# Patient Record
Sex: MALE | Race: WHITE | NOT HISPANIC OR LATINO | URBAN - METROPOLITAN AREA
[De-identification: names, ages, dates, MRNs, and addresses within clinical notes are randomized per-mention and may not be internally consistent; named-entity substitution may affect disease eponyms.]

---

## 2022-01-01 ENCOUNTER — INPATIENT (INPATIENT)
Facility: HOSPITAL | Age: 67
LOS: 5 days | End: 2022-01-09
Attending: INTERNAL MEDICINE | Admitting: INTERNAL MEDICINE
Payer: COMMERCIAL

## 2022-01-01 VITALS
SYSTOLIC BLOOD PRESSURE: 110 MMHG | DIASTOLIC BLOOD PRESSURE: 67 MMHG | RESPIRATION RATE: 18 BRPM | HEART RATE: 100 BPM | OXYGEN SATURATION: 98 % | TEMPERATURE: 98 F

## 2022-01-01 VITALS
RESPIRATION RATE: 18 BRPM | HEART RATE: 108 BPM | OXYGEN SATURATION: 97 % | TEMPERATURE: 98 F | SYSTOLIC BLOOD PRESSURE: 90 MMHG | DIASTOLIC BLOOD PRESSURE: 54 MMHG

## 2022-01-01 DIAGNOSIS — K74.60 UNSPECIFIED CIRRHOSIS OF LIVER: ICD-10-CM

## 2022-01-01 DIAGNOSIS — E87.0 HYPEROSMOLALITY AND HYPERNATREMIA: ICD-10-CM

## 2022-01-01 DIAGNOSIS — N17.9 ACUTE KIDNEY FAILURE, UNSPECIFIED: ICD-10-CM

## 2022-01-01 DIAGNOSIS — Z29.9 ENCOUNTER FOR PROPHYLACTIC MEASURES, UNSPECIFIED: ICD-10-CM

## 2022-01-01 DIAGNOSIS — E11.9 TYPE 2 DIABETES MELLITUS WITHOUT COMPLICATIONS: ICD-10-CM

## 2022-01-01 DIAGNOSIS — A41.9 SEPSIS, UNSPECIFIED ORGANISM: ICD-10-CM

## 2022-01-01 DIAGNOSIS — D64.9 ANEMIA, UNSPECIFIED: ICD-10-CM

## 2022-01-01 DIAGNOSIS — D69.6 THROMBOCYTOPENIA, UNSPECIFIED: ICD-10-CM

## 2022-01-01 DIAGNOSIS — U07.1 COVID-19: ICD-10-CM

## 2022-01-01 DIAGNOSIS — R94.31 ABNORMAL ELECTROCARDIOGRAM [ECG] [EKG]: ICD-10-CM

## 2022-01-01 DIAGNOSIS — M86.68 OTHER CHRONIC OSTEOMYELITIS, OTHER SITE: ICD-10-CM

## 2022-01-01 DIAGNOSIS — R62.7 ADULT FAILURE TO THRIVE: ICD-10-CM

## 2022-01-01 DIAGNOSIS — G93.40 ENCEPHALOPATHY, UNSPECIFIED: ICD-10-CM

## 2022-01-01 DIAGNOSIS — B37.7 CANDIDAL SEPSIS: ICD-10-CM

## 2022-01-01 LAB
A1C WITH ESTIMATED AVERAGE GLUCOSE RESULT: 4.9 % — SIGNIFICANT CHANGE UP (ref 4–5.6)
ALBUMIN SERPL ELPH-MCNC: 2.3 G/DL — LOW (ref 3.3–5)
ALBUMIN SERPL ELPH-MCNC: 2.4 G/DL — LOW (ref 3.3–5)
ALBUMIN SERPL ELPH-MCNC: 2.4 G/DL — LOW (ref 3.3–5)
ALBUMIN SERPL ELPH-MCNC: 2.5 G/DL — LOW (ref 3.3–5)
ALBUMIN SERPL ELPH-MCNC: 2.5 G/DL — LOW (ref 3.3–5)
ALBUMIN SERPL ELPH-MCNC: 2.7 G/DL — LOW (ref 3.3–5)
ALP SERPL-CCNC: 231 U/L — HIGH (ref 40–120)
ALP SERPL-CCNC: 242 U/L — HIGH (ref 40–120)
ALP SERPL-CCNC: 249 U/L — HIGH (ref 40–120)
ALP SERPL-CCNC: 254 U/L — HIGH (ref 40–120)
ALP SERPL-CCNC: 266 U/L — HIGH (ref 40–120)
ALP SERPL-CCNC: 275 U/L — HIGH (ref 40–120)
ALP SERPL-CCNC: 286 U/L — HIGH (ref 40–120)
ALP SERPL-CCNC: 294 U/L — HIGH (ref 40–120)
ALT FLD-CCNC: 11 U/L — SIGNIFICANT CHANGE UP (ref 4–41)
ALT FLD-CCNC: 12 U/L — SIGNIFICANT CHANGE UP (ref 4–41)
ALT FLD-CCNC: 12 U/L — SIGNIFICANT CHANGE UP (ref 4–41)
ALT FLD-CCNC: 13 U/L — SIGNIFICANT CHANGE UP (ref 4–41)
ALT FLD-CCNC: 13 U/L — SIGNIFICANT CHANGE UP (ref 4–41)
ALT FLD-CCNC: 14 U/L — SIGNIFICANT CHANGE UP (ref 4–41)
ALT FLD-CCNC: 16 U/L — SIGNIFICANT CHANGE UP (ref 4–41)
ALT FLD-CCNC: 20 U/L — SIGNIFICANT CHANGE UP (ref 4–41)
AMMONIA BLD-MCNC: 106 UMOL/L — HIGH (ref 11–55)
AMMONIA BLD-MCNC: 65 UMOL/L — HIGH (ref 11–55)
ANION GAP SERPL CALC-SCNC: 16 MMOL/L — HIGH (ref 7–14)
ANION GAP SERPL CALC-SCNC: 18 MMOL/L — HIGH (ref 7–14)
ANION GAP SERPL CALC-SCNC: 18 MMOL/L — HIGH (ref 7–14)
ANION GAP SERPL CALC-SCNC: 19 MMOL/L — HIGH (ref 7–14)
ANION GAP SERPL CALC-SCNC: 19 MMOL/L — HIGH (ref 7–14)
ANION GAP SERPL CALC-SCNC: 20 MMOL/L — HIGH (ref 7–14)
ANION GAP SERPL CALC-SCNC: 24 MMOL/L — HIGH (ref 7–14)
ANION GAP SERPL CALC-SCNC: 27 MMOL/L — HIGH (ref 7–14)
APPEARANCE UR: CLEAR — SIGNIFICANT CHANGE UP
APPEARANCE UR: CLEAR — SIGNIFICANT CHANGE UP
APTT BLD: 31.7 SEC — SIGNIFICANT CHANGE UP (ref 27–36.3)
AST SERPL-CCNC: 13 U/L — SIGNIFICANT CHANGE UP (ref 4–40)
AST SERPL-CCNC: 17 U/L — SIGNIFICANT CHANGE UP (ref 4–40)
AST SERPL-CCNC: 18 U/L — SIGNIFICANT CHANGE UP (ref 4–40)
AST SERPL-CCNC: 18 U/L — SIGNIFICANT CHANGE UP (ref 4–40)
AST SERPL-CCNC: 20 U/L — SIGNIFICANT CHANGE UP (ref 4–40)
AST SERPL-CCNC: 27 U/L — SIGNIFICANT CHANGE UP (ref 4–40)
B PERT DNA SPEC QL NAA+PROBE: SIGNIFICANT CHANGE UP
B PERT+PARAPERT DNA PNL SPEC NAA+PROBE: SIGNIFICANT CHANGE UP
BASE EXCESS BLDV CALC-SCNC: -5.3 MMOL/L — LOW (ref -2–3)
BASE EXCESS BLDV CALC-SCNC: -6 MMOL/L — LOW (ref -2–3)
BASOPHILS # BLD AUTO: 0.01 K/UL — SIGNIFICANT CHANGE UP (ref 0–0.2)
BASOPHILS NFR BLD AUTO: 0.1 % — SIGNIFICANT CHANGE UP (ref 0–2)
BILIRUB SERPL-MCNC: 1.2 MG/DL — SIGNIFICANT CHANGE UP (ref 0.2–1.2)
BILIRUB SERPL-MCNC: 1.3 MG/DL — HIGH (ref 0.2–1.2)
BILIRUB SERPL-MCNC: 1.3 MG/DL — HIGH (ref 0.2–1.2)
BILIRUB SERPL-MCNC: 1.4 MG/DL — HIGH (ref 0.2–1.2)
BILIRUB SERPL-MCNC: 1.5 MG/DL — HIGH (ref 0.2–1.2)
BILIRUB SERPL-MCNC: 1.5 MG/DL — HIGH (ref 0.2–1.2)
BILIRUB SERPL-MCNC: 1.6 MG/DL — HIGH (ref 0.2–1.2)
BILIRUB SERPL-MCNC: 2.2 MG/DL — HIGH (ref 0.2–1.2)
BILIRUB UR-MCNC: NEGATIVE — SIGNIFICANT CHANGE UP
BILIRUB UR-MCNC: NEGATIVE — SIGNIFICANT CHANGE UP
BLOOD GAS VENOUS COMPREHENSIVE RESULT: SIGNIFICANT CHANGE UP
BORDETELLA PARAPERTUSSIS (RAPRVP): SIGNIFICANT CHANGE UP
BUN SERPL-MCNC: 55 MG/DL — HIGH (ref 7–23)
BUN SERPL-MCNC: 59 MG/DL — HIGH (ref 7–23)
BUN SERPL-MCNC: 63 MG/DL — HIGH (ref 7–23)
BUN SERPL-MCNC: 70 MG/DL — HIGH (ref 7–23)
BUN SERPL-MCNC: 78 MG/DL — HIGH (ref 7–23)
BUN SERPL-MCNC: 78 MG/DL — HIGH (ref 7–23)
BUN SERPL-MCNC: 83 MG/DL — HIGH (ref 7–23)
BUN SERPL-MCNC: 84 MG/DL — HIGH (ref 7–23)
BUN SERPL-MCNC: 89 MG/DL — HIGH (ref 7–23)
BUN SERPL-MCNC: 97 MG/DL — HIGH (ref 7–23)
C PNEUM DNA SPEC QL NAA+PROBE: SIGNIFICANT CHANGE UP
CALCIUM SERPL-MCNC: 8 MG/DL — LOW (ref 8.4–10.5)
CALCIUM SERPL-MCNC: 8.1 MG/DL — LOW (ref 8.4–10.5)
CALCIUM SERPL-MCNC: 8.2 MG/DL — LOW (ref 8.4–10.5)
CALCIUM SERPL-MCNC: 8.3 MG/DL — LOW (ref 8.4–10.5)
CALCIUM SERPL-MCNC: 8.4 MG/DL — SIGNIFICANT CHANGE UP (ref 8.4–10.5)
CALCIUM SERPL-MCNC: 8.5 MG/DL — SIGNIFICANT CHANGE UP (ref 8.4–10.5)
CALCIUM SERPL-MCNC: 8.6 MG/DL — SIGNIFICANT CHANGE UP (ref 8.4–10.5)
CALCIUM SERPL-MCNC: 8.8 MG/DL — SIGNIFICANT CHANGE UP (ref 8.4–10.5)
CHLORIDE BLDV-SCNC: 119 MMOL/L — HIGH (ref 96–108)
CHLORIDE BLDV-SCNC: 119 MMOL/L — HIGH (ref 96–108)
CHLORIDE SERPL-SCNC: 116 MMOL/L — HIGH (ref 98–107)
CHLORIDE SERPL-SCNC: 117 MMOL/L — HIGH (ref 98–107)
CHLORIDE SERPL-SCNC: 118 MMOL/L — HIGH (ref 98–107)
CHLORIDE SERPL-SCNC: 118 MMOL/L — HIGH (ref 98–107)
CHLORIDE SERPL-SCNC: 119 MMOL/L — HIGH (ref 98–107)
CHLORIDE SERPL-SCNC: 120 MMOL/L — HIGH (ref 98–107)
CHLORIDE SERPL-SCNC: 120 MMOL/L — HIGH (ref 98–107)
CHLORIDE SERPL-SCNC: 121 MMOL/L — HIGH (ref 98–107)
CO2 BLDV-SCNC: 18.5 MMOL/L — LOW (ref 22–26)
CO2 BLDV-SCNC: 19.3 MMOL/L — LOW (ref 22–26)
CO2 SERPL-SCNC: 12 MMOL/L — LOW (ref 22–31)
CO2 SERPL-SCNC: 14 MMOL/L — LOW (ref 22–31)
CO2 SERPL-SCNC: 14 MMOL/L — LOW (ref 22–31)
CO2 SERPL-SCNC: 16 MMOL/L — LOW (ref 22–31)
CO2 SERPL-SCNC: 17 MMOL/L — LOW (ref 22–31)
CO2 SERPL-SCNC: 19 MMOL/L — LOW (ref 22–31)
CO2 SERPL-SCNC: 19 MMOL/L — LOW (ref 22–31)
COLOR SPEC: YELLOW — SIGNIFICANT CHANGE UP
COLOR SPEC: YELLOW — SIGNIFICANT CHANGE UP
CREAT ?TM UR-MCNC: 165 MG/DL — SIGNIFICANT CHANGE UP
CREAT SERPL-MCNC: 3.68 MG/DL — HIGH (ref 0.5–1.3)
CREAT SERPL-MCNC: 3.81 MG/DL — HIGH (ref 0.5–1.3)
CREAT SERPL-MCNC: 3.85 MG/DL — HIGH (ref 0.5–1.3)
CREAT SERPL-MCNC: 4.16 MG/DL — HIGH (ref 0.5–1.3)
CREAT SERPL-MCNC: 4.45 MG/DL — HIGH (ref 0.5–1.3)
CREAT SERPL-MCNC: 4.51 MG/DL — HIGH (ref 0.5–1.3)
CREAT SERPL-MCNC: 4.76 MG/DL — HIGH (ref 0.5–1.3)
CREAT SERPL-MCNC: 4.8 MG/DL — HIGH (ref 0.5–1.3)
CREAT SERPL-MCNC: 5.28 MG/DL — HIGH (ref 0.5–1.3)
CREAT SERPL-MCNC: 5.61 MG/DL — HIGH (ref 0.5–1.3)
CRP SERPL-MCNC: 145 MG/L — HIGH
CULTURE RESULTS: NO GROWTH — SIGNIFICANT CHANGE UP
CULTURE RESULTS: SIGNIFICANT CHANGE UP
CULTURE RESULTS: SIGNIFICANT CHANGE UP
D DIMER BLD IA.RAPID-MCNC: 975 NG/ML DDU — HIGH
DIFF PNL FLD: ABNORMAL
DIFF PNL FLD: ABNORMAL
EOSINOPHIL # BLD AUTO: 0 K/UL — SIGNIFICANT CHANGE UP (ref 0–0.5)
EOSINOPHIL NFR BLD AUTO: 0 % — SIGNIFICANT CHANGE UP (ref 0–6)
ESTIMATED AVERAGE GLUCOSE: 94 — SIGNIFICANT CHANGE UP
FERRITIN SERPL-MCNC: 1846 NG/ML — HIGH (ref 30–400)
FLUAV SUBTYP SPEC NAA+PROBE: SIGNIFICANT CHANGE UP
FLUBV RNA SPEC QL NAA+PROBE: SIGNIFICANT CHANGE UP
GAS PNL BLDV: 150 MMOL/L — HIGH (ref 136–145)
GAS PNL BLDV: 151 MMOL/L — HIGH (ref 136–145)
GAS PNL BLDV: SIGNIFICANT CHANGE UP
GLUCOSE BLDC GLUCOMTR-MCNC: 107 MG/DL — HIGH (ref 70–99)
GLUCOSE BLDC GLUCOMTR-MCNC: 118 MG/DL — HIGH (ref 70–99)
GLUCOSE BLDC GLUCOMTR-MCNC: 122 MG/DL — HIGH (ref 70–99)
GLUCOSE BLDC GLUCOMTR-MCNC: 125 MG/DL — HIGH (ref 70–99)
GLUCOSE BLDC GLUCOMTR-MCNC: 129 MG/DL — HIGH (ref 70–99)
GLUCOSE BLDC GLUCOMTR-MCNC: 135 MG/DL — HIGH (ref 70–99)
GLUCOSE BLDC GLUCOMTR-MCNC: 140 MG/DL — HIGH (ref 70–99)
GLUCOSE BLDC GLUCOMTR-MCNC: 141 MG/DL — HIGH (ref 70–99)
GLUCOSE BLDC GLUCOMTR-MCNC: 142 MG/DL — HIGH (ref 70–99)
GLUCOSE BLDC GLUCOMTR-MCNC: 144 MG/DL — HIGH (ref 70–99)
GLUCOSE BLDC GLUCOMTR-MCNC: 147 MG/DL — HIGH (ref 70–99)
GLUCOSE BLDC GLUCOMTR-MCNC: 149 MG/DL — HIGH (ref 70–99)
GLUCOSE BLDC GLUCOMTR-MCNC: 149 MG/DL — HIGH (ref 70–99)
GLUCOSE BLDC GLUCOMTR-MCNC: 152 MG/DL — HIGH (ref 70–99)
GLUCOSE BLDC GLUCOMTR-MCNC: 154 MG/DL — HIGH (ref 70–99)
GLUCOSE BLDC GLUCOMTR-MCNC: 167 MG/DL — HIGH (ref 70–99)
GLUCOSE BLDC GLUCOMTR-MCNC: 170 MG/DL — HIGH (ref 70–99)
GLUCOSE BLDC GLUCOMTR-MCNC: 173 MG/DL — HIGH (ref 70–99)
GLUCOSE BLDC GLUCOMTR-MCNC: 181 MG/DL — HIGH (ref 70–99)
GLUCOSE BLDC GLUCOMTR-MCNC: 181 MG/DL — HIGH (ref 70–99)
GLUCOSE BLDC GLUCOMTR-MCNC: 94 MG/DL — SIGNIFICANT CHANGE UP (ref 70–99)
GLUCOSE BLDC GLUCOMTR-MCNC: <25 MG/DL — CRITICAL LOW (ref 70–99)
GLUCOSE BLDC GLUCOMTR-MCNC: <25 MG/DL — CRITICAL LOW (ref 70–99)
GLUCOSE BLDV-MCNC: 151 MG/DL — HIGH (ref 70–99)
GLUCOSE BLDV-MCNC: 172 MG/DL — HIGH (ref 70–99)
GLUCOSE SERPL-MCNC: 128 MG/DL — HIGH (ref 70–99)
GLUCOSE SERPL-MCNC: 142 MG/DL — HIGH (ref 70–99)
GLUCOSE SERPL-MCNC: 144 MG/DL — HIGH (ref 70–99)
GLUCOSE SERPL-MCNC: 147 MG/DL — HIGH (ref 70–99)
GLUCOSE SERPL-MCNC: 160 MG/DL — HIGH (ref 70–99)
GLUCOSE SERPL-MCNC: 172 MG/DL — HIGH (ref 70–99)
GLUCOSE SERPL-MCNC: 174 MG/DL — HIGH (ref 70–99)
GLUCOSE SERPL-MCNC: 181 MG/DL — HIGH (ref 70–99)
GLUCOSE SERPL-MCNC: 181 MG/DL — HIGH (ref 70–99)
GLUCOSE SERPL-MCNC: 205 MG/DL — HIGH (ref 70–99)
GLUCOSE UR QL: NEGATIVE — SIGNIFICANT CHANGE UP
GLUCOSE UR QL: NEGATIVE — SIGNIFICANT CHANGE UP
HADV DNA SPEC QL NAA+PROBE: SIGNIFICANT CHANGE UP
HCO3 BLDV-SCNC: 18 MMOL/L — LOW (ref 22–29)
HCO3 BLDV-SCNC: 18 MMOL/L — LOW (ref 22–29)
HCOV 229E RNA SPEC QL NAA+PROBE: SIGNIFICANT CHANGE UP
HCOV HKU1 RNA SPEC QL NAA+PROBE: SIGNIFICANT CHANGE UP
HCOV NL63 RNA SPEC QL NAA+PROBE: SIGNIFICANT CHANGE UP
HCOV OC43 RNA SPEC QL NAA+PROBE: SIGNIFICANT CHANGE UP
HCT VFR BLD CALC: 33.1 % — LOW (ref 39–50)
HCT VFR BLD CALC: 33.7 % — LOW (ref 39–50)
HCT VFR BLD CALC: 34.7 % — LOW (ref 39–50)
HCT VFR BLD CALC: 34.8 % — LOW (ref 39–50)
HCT VFR BLD CALC: 35.3 % — LOW (ref 39–50)
HCT VFR BLD CALC: 36.1 % — LOW (ref 39–50)
HCT VFR BLDA CALC: 28 % — LOW (ref 39–51)
HCT VFR BLDA CALC: 32 % — LOW (ref 39–51)
HCV AB S/CO SERPL IA: 0.53 S/CO — SIGNIFICANT CHANGE UP (ref 0–0.99)
HCV AB SERPL-IMP: SIGNIFICANT CHANGE UP
HGB BLD CALC-MCNC: 10.6 G/DL — LOW (ref 13–17)
HGB BLD CALC-MCNC: 9.3 G/DL — LOW (ref 13–17)
HGB BLD-MCNC: 10.2 G/DL — LOW (ref 13–17)
HGB BLD-MCNC: 10.6 G/DL — LOW (ref 13–17)
HGB BLD-MCNC: 10.6 G/DL — LOW (ref 13–17)
HGB BLD-MCNC: 10.9 G/DL — LOW (ref 13–17)
HGB BLD-MCNC: 11 G/DL — LOW (ref 13–17)
HGB BLD-MCNC: 11.2 G/DL — LOW (ref 13–17)
HMPV RNA SPEC QL NAA+PROBE: SIGNIFICANT CHANGE UP
HPIV1 RNA SPEC QL NAA+PROBE: SIGNIFICANT CHANGE UP
HPIV2 RNA SPEC QL NAA+PROBE: SIGNIFICANT CHANGE UP
HPIV3 RNA SPEC QL NAA+PROBE: SIGNIFICANT CHANGE UP
HPIV4 RNA SPEC QL NAA+PROBE: SIGNIFICANT CHANGE UP
IANC: 10.36 K/UL — HIGH (ref 1.5–8.5)
IMM GRANULOCYTES NFR BLD AUTO: 0.7 % — SIGNIFICANT CHANGE UP (ref 0–1.5)
INR BLD: 1.34 RATIO — HIGH (ref 0.88–1.16)
INR BLD: 1.43 RATIO — HIGH (ref 0.88–1.16)
KETONES UR-MCNC: NEGATIVE — SIGNIFICANT CHANGE UP
KETONES UR-MCNC: NEGATIVE — SIGNIFICANT CHANGE UP
LACTATE BLDV-MCNC: 3.4 MMOL/L — HIGH (ref 0.5–2)
LACTATE BLDV-MCNC: 4.1 MMOL/L — CRITICAL HIGH (ref 0.5–2)
LDH SERPL L TO P-CCNC: 202 U/L — SIGNIFICANT CHANGE UP (ref 135–225)
LEUKOCYTE ESTERASE UR-ACNC: NEGATIVE — SIGNIFICANT CHANGE UP
LEUKOCYTE ESTERASE UR-ACNC: NEGATIVE — SIGNIFICANT CHANGE UP
LYMPHOCYTES # BLD AUTO: 1.34 K/UL — SIGNIFICANT CHANGE UP (ref 1–3.3)
LYMPHOCYTES # BLD AUTO: 10.5 % — LOW (ref 13–44)
M PNEUMO DNA SPEC QL NAA+PROBE: SIGNIFICANT CHANGE UP
MAGNESIUM SERPL-MCNC: 1.9 MG/DL — SIGNIFICANT CHANGE UP (ref 1.6–2.6)
MAGNESIUM SERPL-MCNC: 2 MG/DL — SIGNIFICANT CHANGE UP (ref 1.6–2.6)
MAGNESIUM SERPL-MCNC: 2 MG/DL — SIGNIFICANT CHANGE UP (ref 1.6–2.6)
MAGNESIUM SERPL-MCNC: 2.1 MG/DL — SIGNIFICANT CHANGE UP (ref 1.6–2.6)
MAGNESIUM SERPL-MCNC: 2.2 MG/DL — SIGNIFICANT CHANGE UP (ref 1.6–2.6)
MCHC RBC-ENTMCNC: 28.7 PG — SIGNIFICANT CHANGE UP (ref 27–34)
MCHC RBC-ENTMCNC: 29 PG — SIGNIFICANT CHANGE UP (ref 27–34)
MCHC RBC-ENTMCNC: 29.4 PG — SIGNIFICANT CHANGE UP (ref 27–34)
MCHC RBC-ENTMCNC: 29.5 PG — SIGNIFICANT CHANGE UP (ref 27–34)
MCHC RBC-ENTMCNC: 29.6 PG — SIGNIFICANT CHANGE UP (ref 27–34)
MCHC RBC-ENTMCNC: 29.9 PG — SIGNIFICANT CHANGE UP (ref 27–34)
MCHC RBC-ENTMCNC: 30.5 GM/DL — LOW (ref 32–36)
MCHC RBC-ENTMCNC: 30.5 GM/DL — LOW (ref 32–36)
MCHC RBC-ENTMCNC: 30.8 GM/DL — LOW (ref 32–36)
MCHC RBC-ENTMCNC: 31.3 GM/DL — LOW (ref 32–36)
MCHC RBC-ENTMCNC: 31.5 GM/DL — LOW (ref 32–36)
MCHC RBC-ENTMCNC: 31.7 GM/DL — LOW (ref 32–36)
MCV RBC AUTO: 92.3 FL — SIGNIFICANT CHANGE UP (ref 80–100)
MCV RBC AUTO: 93.2 FL — SIGNIFICANT CHANGE UP (ref 80–100)
MCV RBC AUTO: 94.1 FL — SIGNIFICANT CHANGE UP (ref 80–100)
MCV RBC AUTO: 94.1 FL — SIGNIFICANT CHANGE UP (ref 80–100)
MCV RBC AUTO: 96.4 FL — SIGNIFICANT CHANGE UP (ref 80–100)
MCV RBC AUTO: 97 FL — SIGNIFICANT CHANGE UP (ref 80–100)
MELD SCORE WITH DIALYSIS: 25 POINTS — SIGNIFICANT CHANGE UP
MELD SCORE WITHOUT DIALYSIS: 25 POINTS — SIGNIFICANT CHANGE UP
MONOCYTES # BLD AUTO: 1.01 K/UL — HIGH (ref 0–0.9)
MONOCYTES NFR BLD AUTO: 7.9 % — SIGNIFICANT CHANGE UP (ref 2–14)
NEUTROPHILS # BLD AUTO: 10.36 K/UL — HIGH (ref 1.8–7.4)
NEUTROPHILS NFR BLD AUTO: 80.8 % — HIGH (ref 43–77)
NITRITE UR-MCNC: NEGATIVE — SIGNIFICANT CHANGE UP
NITRITE UR-MCNC: NEGATIVE — SIGNIFICANT CHANGE UP
NRBC # BLD: 0 /100 WBCS — SIGNIFICANT CHANGE UP
NRBC # FLD: 0 K/UL — SIGNIFICANT CHANGE UP
NRBC # FLD: 0.02 K/UL — HIGH
NRBC # FLD: 0.03 K/UL — HIGH
NRBC # FLD: 0.04 K/UL — HIGH
NT-PROBNP SERPL-SCNC: 1547 PG/ML — HIGH
OSMOLALITY UR: 391 MOSM/KG — SIGNIFICANT CHANGE UP (ref 50–1200)
OSMOLALITY UR: 405 MOSM/KG — SIGNIFICANT CHANGE UP (ref 50–1200)
PCO2 BLDV: 26 MMHG — LOW (ref 42–55)
PCO2 BLDV: 31 MMHG — LOW (ref 42–55)
PH BLDV: 7.38 — SIGNIFICANT CHANGE UP (ref 7.32–7.43)
PH BLDV: 7.44 — HIGH (ref 7.32–7.43)
PH UR: 6 — SIGNIFICANT CHANGE UP (ref 5–8)
PH UR: 6 — SIGNIFICANT CHANGE UP (ref 5–8)
PHOSPHATE SERPL-MCNC: 4.9 MG/DL — HIGH (ref 2.5–4.5)
PHOSPHATE SERPL-MCNC: 5.1 MG/DL — HIGH (ref 2.5–4.5)
PHOSPHATE SERPL-MCNC: 5.1 MG/DL — HIGH (ref 2.5–4.5)
PHOSPHATE SERPL-MCNC: 5.2 MG/DL — HIGH (ref 2.5–4.5)
PHOSPHATE SERPL-MCNC: 5.2 MG/DL — HIGH (ref 2.5–4.5)
PHOSPHATE SERPL-MCNC: 5.4 MG/DL — HIGH (ref 2.5–4.5)
PHOSPHATE SERPL-MCNC: 6.5 MG/DL — HIGH (ref 2.5–4.5)
PHOSPHATE SERPL-MCNC: 7 MG/DL — HIGH (ref 2.5–4.5)
PLATELET # BLD AUTO: 101 K/UL — LOW (ref 150–400)
PLATELET # BLD AUTO: 81 K/UL — LOW (ref 150–400)
PLATELET # BLD AUTO: 83 K/UL — LOW (ref 150–400)
PLATELET # BLD AUTO: 87 K/UL — LOW (ref 150–400)
PLATELET # BLD AUTO: 87 K/UL — LOW (ref 150–400)
PLATELET # BLD AUTO: 96 K/UL — LOW (ref 150–400)
PO2 BLDV: 171 MMHG — SIGNIFICANT CHANGE UP
PO2 BLDV: 30 MMHG — SIGNIFICANT CHANGE UP
POTASSIUM BLDV-SCNC: 3.5 MMOL/L — SIGNIFICANT CHANGE UP (ref 3.5–5.1)
POTASSIUM BLDV-SCNC: 3.7 MMOL/L — SIGNIFICANT CHANGE UP (ref 3.5–5.1)
POTASSIUM SERPL-MCNC: 2.9 MMOL/L — CRITICAL LOW (ref 3.5–5.3)
POTASSIUM SERPL-MCNC: 3.2 MMOL/L — LOW (ref 3.5–5.3)
POTASSIUM SERPL-MCNC: 3.2 MMOL/L — LOW (ref 3.5–5.3)
POTASSIUM SERPL-MCNC: 3.6 MMOL/L — SIGNIFICANT CHANGE UP (ref 3.5–5.3)
POTASSIUM SERPL-MCNC: 3.7 MMOL/L — SIGNIFICANT CHANGE UP (ref 3.5–5.3)
POTASSIUM SERPL-MCNC: 3.8 MMOL/L — SIGNIFICANT CHANGE UP (ref 3.5–5.3)
POTASSIUM SERPL-MCNC: 3.9 MMOL/L — SIGNIFICANT CHANGE UP (ref 3.5–5.3)
POTASSIUM SERPL-SCNC: 2.9 MMOL/L — CRITICAL LOW (ref 3.5–5.3)
POTASSIUM SERPL-SCNC: 3.2 MMOL/L — LOW (ref 3.5–5.3)
POTASSIUM SERPL-SCNC: 3.2 MMOL/L — LOW (ref 3.5–5.3)
POTASSIUM SERPL-SCNC: 3.6 MMOL/L — SIGNIFICANT CHANGE UP (ref 3.5–5.3)
POTASSIUM SERPL-SCNC: 3.7 MMOL/L — SIGNIFICANT CHANGE UP (ref 3.5–5.3)
POTASSIUM SERPL-SCNC: 3.8 MMOL/L — SIGNIFICANT CHANGE UP (ref 3.5–5.3)
POTASSIUM SERPL-SCNC: 3.9 MMOL/L — SIGNIFICANT CHANGE UP (ref 3.5–5.3)
PROCALCITONIN SERPL-MCNC: 3.06 NG/ML — HIGH (ref 0.02–0.1)
PROT SERPL-MCNC: 5.8 G/DL — LOW (ref 6–8.3)
PROT SERPL-MCNC: 6 G/DL — SIGNIFICANT CHANGE UP (ref 6–8.3)
PROT SERPL-MCNC: 6.1 G/DL — SIGNIFICANT CHANGE UP (ref 6–8.3)
PROT SERPL-MCNC: 6.4 G/DL — SIGNIFICANT CHANGE UP (ref 6–8.3)
PROT SERPL-MCNC: 6.7 G/DL — SIGNIFICANT CHANGE UP (ref 6–8.3)
PROT SERPL-MCNC: 6.9 G/DL — SIGNIFICANT CHANGE UP (ref 6–8.3)
PROT UR-MCNC: ABNORMAL
PROT UR-MCNC: ABNORMAL
PROTHROM AB SERPL-ACNC: 15.1 SEC — HIGH (ref 10.6–13.6)
PROTHROM AB SERPL-ACNC: 16 SEC — HIGH (ref 10.6–13.6)
RAPID RVP RESULT: DETECTED
RBC # BLD: 3.55 M/UL — LOW (ref 4.2–5.8)
RBC # BLD: 3.6 M/UL — LOW (ref 4.2–5.8)
RBC # BLD: 3.65 M/UL — LOW (ref 4.2–5.8)
RBC # BLD: 3.7 M/UL — LOW (ref 4.2–5.8)
RBC # BLD: 3.72 M/UL — LOW (ref 4.2–5.8)
RBC # BLD: 3.75 M/UL — LOW (ref 4.2–5.8)
RBC # FLD: 20.2 % — HIGH (ref 10.3–14.5)
RBC # FLD: 20.4 % — HIGH (ref 10.3–14.5)
RBC # FLD: 20.6 % — HIGH (ref 10.3–14.5)
RBC # FLD: 20.7 % — HIGH (ref 10.3–14.5)
RBC # FLD: 20.8 % — HIGH (ref 10.3–14.5)
RBC # FLD: 20.9 % — HIGH (ref 10.3–14.5)
RSV RNA SPEC QL NAA+PROBE: SIGNIFICANT CHANGE UP
RV+EV RNA SPEC QL NAA+PROBE: SIGNIFICANT CHANGE UP
SAO2 % BLDV: 100 % — SIGNIFICANT CHANGE UP
SAO2 % BLDV: 45.9 % — SIGNIFICANT CHANGE UP
SARS-COV-2 RNA SPEC QL NAA+PROBE: DETECTED
SODIUM SERPL-SCNC: 152 MMOL/L — HIGH (ref 135–145)
SODIUM SERPL-SCNC: 153 MMOL/L — HIGH (ref 135–145)
SODIUM SERPL-SCNC: 154 MMOL/L — HIGH (ref 135–145)
SODIUM SERPL-SCNC: 155 MMOL/L — HIGH (ref 135–145)
SODIUM SERPL-SCNC: 155 MMOL/L — HIGH (ref 135–145)
SODIUM SERPL-SCNC: 156 MMOL/L — HIGH (ref 135–145)
SODIUM UR-SCNC: <20 MMOL/L — SIGNIFICANT CHANGE UP
SP GR SPEC: 1.02 — SIGNIFICANT CHANGE UP (ref 1–1.05)
SP GR SPEC: 1.02 — SIGNIFICANT CHANGE UP (ref 1–1.05)
SPECIMEN SOURCE: SIGNIFICANT CHANGE UP
TROPONIN T, HIGH SENSITIVITY RESULT: 64 NG/L — CRITICAL HIGH
TROPONIN T, HIGH SENSITIVITY RESULT: 66 NG/L — CRITICAL HIGH
TSH SERPL-MCNC: 1.33 UIU/ML — SIGNIFICANT CHANGE UP (ref 0.27–4.2)
UROBILINOGEN FLD QL: SIGNIFICANT CHANGE UP
UROBILINOGEN FLD QL: SIGNIFICANT CHANGE UP
VIT B12 SERPL-MCNC: 832 PG/ML — SIGNIFICANT CHANGE UP (ref 200–900)
WBC # BLD: 12.81 K/UL — HIGH (ref 3.8–10.5)
WBC # BLD: 3.77 K/UL — LOW (ref 3.8–10.5)
WBC # BLD: 5.19 K/UL — SIGNIFICANT CHANGE UP (ref 3.8–10.5)
WBC # BLD: 7.85 K/UL — SIGNIFICANT CHANGE UP (ref 3.8–10.5)
WBC # BLD: 7.85 K/UL — SIGNIFICANT CHANGE UP (ref 3.8–10.5)
WBC # BLD: 9.34 K/UL — SIGNIFICANT CHANGE UP (ref 3.8–10.5)
WBC # FLD AUTO: 12.81 K/UL — HIGH (ref 3.8–10.5)
WBC # FLD AUTO: 3.77 K/UL — LOW (ref 3.8–10.5)
WBC # FLD AUTO: 5.19 K/UL — SIGNIFICANT CHANGE UP (ref 3.8–10.5)
WBC # FLD AUTO: 7.85 K/UL — SIGNIFICANT CHANGE UP (ref 3.8–10.5)
WBC # FLD AUTO: 7.85 K/UL — SIGNIFICANT CHANGE UP (ref 3.8–10.5)
WBC # FLD AUTO: 9.34 K/UL — SIGNIFICANT CHANGE UP (ref 3.8–10.5)

## 2022-01-01 PROCEDURE — 71045 X-RAY EXAM CHEST 1 VIEW: CPT | Mod: 26

## 2022-01-01 PROCEDURE — 99232 SBSQ HOSP IP/OBS MODERATE 35: CPT

## 2022-01-01 PROCEDURE — 99223 1ST HOSP IP/OBS HIGH 75: CPT

## 2022-01-01 PROCEDURE — 70450 CT HEAD/BRAIN W/O DYE: CPT | Mod: 26

## 2022-01-01 PROCEDURE — 74018 RADEX ABDOMEN 1 VIEW: CPT | Mod: 26

## 2022-01-01 PROCEDURE — 93306 TTE W/DOPPLER COMPLETE: CPT | Mod: 26

## 2022-01-01 PROCEDURE — 76770 US EXAM ABDO BACK WALL COMP: CPT | Mod: 26

## 2022-01-01 PROCEDURE — 99285 EMERGENCY DEPT VISIT HI MDM: CPT | Mod: GC

## 2022-01-01 PROCEDURE — 74176 CT ABD & PELVIS W/O CONTRAST: CPT | Mod: 26,MA

## 2022-01-01 PROCEDURE — 99255 IP/OBS CONSLTJ NEW/EST HI 80: CPT

## 2022-01-01 RX ORDER — SODIUM CHLORIDE 9 MG/ML
1000 INJECTION INTRAMUSCULAR; INTRAVENOUS; SUBCUTANEOUS
Refills: 0 | Status: DISCONTINUED | OUTPATIENT
Start: 2022-01-01 | End: 2022-01-01

## 2022-01-01 RX ORDER — SODIUM CHLORIDE 9 MG/ML
1000 INJECTION, SOLUTION INTRAVENOUS
Refills: 0 | Status: DISCONTINUED | OUTPATIENT
Start: 2022-01-01 | End: 2022-01-01

## 2022-01-01 RX ORDER — DEXTROSE 50 % IN WATER 50 %
25 SYRINGE (ML) INTRAVENOUS ONCE
Refills: 0 | Status: DISCONTINUED | OUTPATIENT
Start: 2022-01-01 | End: 2022-01-01

## 2022-01-01 RX ORDER — METFORMIN HYDROCHLORIDE 850 MG/1
1 TABLET ORAL
Qty: 0 | Refills: 0 | DISCHARGE

## 2022-01-01 RX ORDER — INSULIN LISPRO 100/ML
8 VIAL (ML) SUBCUTANEOUS
Qty: 0 | Refills: 0 | DISCHARGE

## 2022-01-01 RX ORDER — LACTULOSE 10 G/15ML
20 SOLUTION ORAL
Qty: 0 | Refills: 0 | DISCHARGE

## 2022-01-01 RX ORDER — ESOMEPRAZOLE MAGNESIUM 40 MG/1
1 CAPSULE, DELAYED RELEASE ORAL
Qty: 0 | Refills: 0 | DISCHARGE

## 2022-01-01 RX ORDER — CASPOFUNGIN ACETATE 7 MG/ML
50 INJECTION, POWDER, LYOPHILIZED, FOR SOLUTION INTRAVENOUS EVERY 24 HOURS
Refills: 0 | Status: DISCONTINUED | OUTPATIENT
Start: 2022-01-01 | End: 2022-01-01

## 2022-01-01 RX ORDER — CEFTRIAXONE 500 MG/1
1000 INJECTION, POWDER, FOR SOLUTION INTRAMUSCULAR; INTRAVENOUS ONCE
Refills: 0 | Status: COMPLETED | OUTPATIENT
Start: 2022-01-01 | End: 2022-01-01

## 2022-01-01 RX ORDER — POTASSIUM CHLORIDE 20 MEQ
10 PACKET (EA) ORAL
Refills: 0 | Status: COMPLETED | OUTPATIENT
Start: 2022-01-01 | End: 2022-01-01

## 2022-01-01 RX ORDER — CASPOFUNGIN ACETATE 7 MG/ML
70 INJECTION, POWDER, LYOPHILIZED, FOR SOLUTION INTRAVENOUS ONCE
Refills: 0 | Status: COMPLETED | OUTPATIENT
Start: 2022-01-01 | End: 2022-01-01

## 2022-01-01 RX ORDER — INSULIN LISPRO 100/ML
VIAL (ML) SUBCUTANEOUS EVERY 6 HOURS
Refills: 0 | Status: DISCONTINUED | OUTPATIENT
Start: 2022-01-01 | End: 2022-01-01

## 2022-01-01 RX ORDER — DEXTROSE 50 % IN WATER 50 %
12.5 SYRINGE (ML) INTRAVENOUS ONCE
Refills: 0 | Status: DISCONTINUED | OUTPATIENT
Start: 2022-01-01 | End: 2022-01-01

## 2022-01-01 RX ORDER — AMPHOTERICIN B 50 MG/12.5ML
425 INJECTION, POWDER, LYOPHILIZED, FOR SOLUTION INTRAVENOUS
Qty: 0 | Refills: 0 | DISCHARGE
End: 2022-01-17

## 2022-01-01 RX ORDER — GLUCAGON INJECTION, SOLUTION 0.5 MG/.1ML
1 INJECTION, SOLUTION SUBCUTANEOUS ONCE
Refills: 0 | Status: DISCONTINUED | OUTPATIENT
Start: 2022-01-01 | End: 2022-01-01

## 2022-01-01 RX ORDER — SODIUM CHLORIDE 9 MG/ML
1000 INJECTION, SOLUTION INTRAVENOUS ONCE
Refills: 0 | Status: COMPLETED | OUTPATIENT
Start: 2022-01-01 | End: 2022-01-01

## 2022-01-01 RX ORDER — LACTULOSE 10 G/15ML
30 SOLUTION ORAL
Qty: 0 | Refills: 0 | DISCHARGE

## 2022-01-01 RX ORDER — DEXTROSE 50 % IN WATER 50 %
15 SYRINGE (ML) INTRAVENOUS ONCE
Refills: 0 | Status: DISCONTINUED | OUTPATIENT
Start: 2022-01-01 | End: 2022-01-01

## 2022-01-01 RX ORDER — LACTULOSE 10 G/15ML
200 SOLUTION ORAL ONCE
Refills: 0 | Status: COMPLETED | OUTPATIENT
Start: 2022-01-01 | End: 2022-01-01

## 2022-01-01 RX ORDER — HEPARIN SODIUM 5000 [USP'U]/ML
5000 INJECTION INTRAVENOUS; SUBCUTANEOUS EVERY 12 HOURS
Refills: 0 | Status: DISCONTINUED | OUTPATIENT
Start: 2022-01-01 | End: 2022-01-01

## 2022-01-01 RX ORDER — FLUCONAZOLE 150 MG/1
200 TABLET ORAL EVERY 24 HOURS
Refills: 0 | Status: DISCONTINUED | OUTPATIENT
Start: 2022-01-01 | End: 2022-01-01

## 2022-01-01 RX ORDER — LACTULOSE 10 G/15ML
10 SOLUTION ORAL ONCE
Refills: 0 | Status: COMPLETED | OUTPATIENT
Start: 2022-01-01 | End: 2022-01-01

## 2022-01-01 RX ORDER — CASPOFUNGIN ACETATE 7 MG/ML
INJECTION, POWDER, LYOPHILIZED, FOR SOLUTION INTRAVENOUS
Refills: 0 | Status: DISCONTINUED | OUTPATIENT
Start: 2022-01-01 | End: 2022-01-01

## 2022-01-01 RX ORDER — FOLIC ACID 0.8 MG
1 TABLET ORAL
Qty: 0 | Refills: 0 | DISCHARGE

## 2022-01-01 RX ORDER — CALCITRIOL 0.5 UG/1
1 CAPSULE ORAL
Qty: 0 | Refills: 0 | DISCHARGE

## 2022-01-01 RX ORDER — GABAPENTIN 400 MG/1
1 CAPSULE ORAL
Qty: 0 | Refills: 0 | DISCHARGE

## 2022-01-01 RX ORDER — LACTULOSE 10 G/15ML
200 SOLUTION ORAL ONCE
Refills: 0 | Status: DISCONTINUED | OUTPATIENT
Start: 2022-01-01 | End: 2022-01-01

## 2022-01-01 RX ORDER — LACTULOSE 10 G/15ML
10 SOLUTION ORAL DAILY
Refills: 0 | Status: DISCONTINUED | OUTPATIENT
Start: 2022-01-01 | End: 2022-01-01

## 2022-01-01 RX ADMIN — HEPARIN SODIUM 5000 UNIT(S): 5000 INJECTION INTRAVENOUS; SUBCUTANEOUS at 06:31

## 2022-01-01 RX ADMIN — HEPARIN SODIUM 5000 UNIT(S): 5000 INJECTION INTRAVENOUS; SUBCUTANEOUS at 18:14

## 2022-01-01 RX ADMIN — Medication 1: at 12:47

## 2022-01-01 RX ADMIN — Medication 100 MILLIEQUIVALENT(S): at 18:51

## 2022-01-01 RX ADMIN — LACTULOSE 10 GRAM(S): 10 SOLUTION ORAL at 18:19

## 2022-01-01 RX ADMIN — SODIUM CHLORIDE 100 MILLILITER(S): 9 INJECTION, SOLUTION INTRAVENOUS at 10:22

## 2022-01-01 RX ADMIN — FLUCONAZOLE 100 MILLIGRAM(S): 150 TABLET ORAL at 18:44

## 2022-01-01 RX ADMIN — HEPARIN SODIUM 5000 UNIT(S): 5000 INJECTION INTRAVENOUS; SUBCUTANEOUS at 18:02

## 2022-01-01 RX ADMIN — FLUCONAZOLE 100 MILLIGRAM(S): 150 TABLET ORAL at 17:57

## 2022-01-01 RX ADMIN — Medication 100 MILLIEQUIVALENT(S): at 07:39

## 2022-01-01 RX ADMIN — Medication 1: at 05:58

## 2022-01-01 RX ADMIN — SODIUM CHLORIDE 1000 MILLILITER(S): 9 INJECTION, SOLUTION INTRAVENOUS at 22:45

## 2022-01-01 RX ADMIN — SODIUM CHLORIDE 1000 MILLILITER(S): 9 INJECTION, SOLUTION INTRAVENOUS at 20:10

## 2022-01-01 RX ADMIN — CASPOFUNGIN ACETATE 260 MILLIGRAM(S): 7 INJECTION, POWDER, LYOPHILIZED, FOR SOLUTION INTRAVENOUS at 18:04

## 2022-01-01 RX ADMIN — LACTULOSE 10 GRAM(S): 10 SOLUTION ORAL at 16:24

## 2022-01-01 RX ADMIN — Medication 100 MILLIEQUIVALENT(S): at 09:01

## 2022-01-01 RX ADMIN — Medication 1: at 06:20

## 2022-01-01 RX ADMIN — SODIUM CHLORIDE 100 MILLILITER(S): 9 INJECTION, SOLUTION INTRAVENOUS at 12:41

## 2022-01-01 RX ADMIN — SODIUM CHLORIDE 75 MILLILITER(S): 9 INJECTION, SOLUTION INTRAVENOUS at 19:40

## 2022-01-01 RX ADMIN — SODIUM CHLORIDE 100 MILLILITER(S): 9 INJECTION INTRAMUSCULAR; INTRAVENOUS; SUBCUTANEOUS at 23:21

## 2022-01-01 RX ADMIN — LACTULOSE 200 GRAM(S): 10 SOLUTION ORAL at 12:49

## 2022-01-01 RX ADMIN — Medication 100 MILLIEQUIVALENT(S): at 22:18

## 2022-01-01 RX ADMIN — Medication 1: at 12:43

## 2022-01-01 RX ADMIN — Medication 1: at 18:03

## 2022-01-01 RX ADMIN — Medication 100 MILLIEQUIVALENT(S): at 09:03

## 2022-01-01 RX ADMIN — HEPARIN SODIUM 5000 UNIT(S): 5000 INJECTION INTRAVENOUS; SUBCUTANEOUS at 05:56

## 2022-01-01 RX ADMIN — HEPARIN SODIUM 5000 UNIT(S): 5000 INJECTION INTRAVENOUS; SUBCUTANEOUS at 05:51

## 2022-01-01 RX ADMIN — Medication 1: at 12:59

## 2022-01-01 RX ADMIN — LACTULOSE 200 GRAM(S): 10 SOLUTION ORAL at 13:23

## 2022-01-01 RX ADMIN — SODIUM CHLORIDE 150 MILLILITER(S): 9 INJECTION INTRAMUSCULAR; INTRAVENOUS; SUBCUTANEOUS at 12:00

## 2022-01-01 RX ADMIN — HEPARIN SODIUM 5000 UNIT(S): 5000 INJECTION INTRAVENOUS; SUBCUTANEOUS at 06:21

## 2022-01-01 RX ADMIN — Medication 100 MILLIEQUIVALENT(S): at 20:47

## 2022-01-01 RX ADMIN — HEPARIN SODIUM 5000 UNIT(S): 5000 INJECTION INTRAVENOUS; SUBCUTANEOUS at 18:45

## 2022-01-01 RX ADMIN — CEFTRIAXONE 100 MILLIGRAM(S): 500 INJECTION, POWDER, FOR SOLUTION INTRAMUSCULAR; INTRAVENOUS at 22:44

## 2022-01-01 RX ADMIN — FLUCONAZOLE 100 MILLIGRAM(S): 150 TABLET ORAL at 18:14

## 2022-01-01 RX ADMIN — SODIUM CHLORIDE 100 MILLILITER(S): 9 INJECTION INTRAMUSCULAR; INTRAVENOUS; SUBCUTANEOUS at 20:22

## 2022-01-01 RX ADMIN — HEPARIN SODIUM 5000 UNIT(S): 5000 INJECTION INTRAVENOUS; SUBCUTANEOUS at 18:07

## 2022-01-01 RX ADMIN — SODIUM CHLORIDE 150 MILLILITER(S): 9 INJECTION INTRAMUSCULAR; INTRAVENOUS; SUBCUTANEOUS at 10:31

## 2022-01-01 RX ADMIN — LACTULOSE 200 GRAM(S): 10 SOLUTION ORAL at 21:47

## 2022-01-01 RX ADMIN — FLUCONAZOLE 100 MILLIGRAM(S): 150 TABLET ORAL at 18:07

## 2022-01-03 NOTE — ED PROVIDER NOTE - ATTENDING CONTRIBUTION TO CARE
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 patient moaning, not follow commands, unable to give hx. NH paper reviewed. exam as above. plan: labs, ct, ivf, admit.

## 2022-01-03 NOTE — ED ADULT NURSE NOTE - NSIMPLEMENTINTERV_GEN_ALL_ED
Implemented All Fall Risk Interventions:  [employer] to call system. Call bell, personal items and telephone within reach. Instruct patient to call for assistance. Room bathroom lighting operational. Non-slip footwear when patient is off stretcher. Physically safe environment: no spills, clutter or unnecessary equipment. Stretcher in lowest position, wheels locked, appropriate side rails in place. Provide visual cue, wrist band, yellow gown, etc. Monitor gait and stability. Monitor for mental status changes and reorient to person, place, and time. Review medications for side effects contributing to fall risk. Reinforce activity limits and safety measures with patient and family.

## 2022-01-03 NOTE — ED ADULT TRIAGE NOTE - CHIEF COMPLAINT QUOTE
Pt from nsg home not eating or drinking more lethargic . Pt with diarrhea. finger stick 105 Pt hypotensive,

## 2022-01-03 NOTE — ED ADULT NURSE REASSESSMENT NOTE - NS ED NURSE REASSESS COMMENT FT1
Pt a&ox0, resting in bed, changed and turned. Skin intact, no redness, bruising, or signs of pressure ulcer. NSR on cardiac monitor. Respirations are even and unlabored, no signs of respiratory distress. Pt a&ox0, resting in bed, changed and turned. Skin intact, slight redness and irritation noted to bilateral medial side of buttocks, moisture barrier cream applied. No bruising, ecchymosis or signs of pressure ulcer. NSR on cardiac monitor. Respirations are even and unlabored, no signs of respiratory distress.

## 2022-01-03 NOTE — ED PROVIDER NOTE - PHYSICAL EXAMINATION
Physical Exam:  Gen: cachectic, moaning on stretcher   Head: NCAT  HEENT: EOMI, normal conjunctiva, tongue midline, oral mucosa dry, poor dentition   Lung: CTAB, no respiratory distress, no wheezes/rhonchi/rales B/L  CV: RRR, no murmurs, rubs or gallops  Abd: soft, +diffuse tenderness and guarding in B/L upper quadrants, pt unable to localize pain, ND, no rigidity, no rebound tenderness, no CVA tenderness   MSK: no visible deformities, contracted B/L UE/LE  Neuro: Moving all extremities spontaneously   Skin: Warm, well perfused, no rash, no leg swelling  Vickie Fiore D.O. -Resident

## 2022-01-03 NOTE — ED PROVIDER NOTE - OBJECTIVE STATEMENT
66y M w/ PMHx Osteomyelitis of lumbar spine, liver cirrhosis, CKD, T2DM currently on Amphotericin for fungemia (Candida through 1/17/22) BIBEMS from [facility] for decreased PO intake, refusal of PO medications 66y M w/ PMHx Osteomyelitis of lumbar spine, liver cirrhosis, CKD, T2DM currently on Amphotericin for fungemia (Candida through 1/17/22) BIBEMS from [facility] for decreased PO intake, refusal of PO medications. Unable to obtain hx from patient due to baseline non-communicative. Staff unable to offer further history at this time. Reported patient ripped out mid-line and refused replacement for anti-fungal medications. Patient moaning on stretcher, not responding to verbal commands.

## 2022-01-03 NOTE — ED ADULT NURSE NOTE - OBJECTIVE STATEMENT
Pt presents to room 2, non-verbal at this time, coming from NH presenting with hypotension, poor oral intake and refusal of PO meds at NH. Upon arrival, pt feels warm, 99.6F rectal, sinus tachy to 110s on tele, breathing non-labored. Pt clean and repositioned, stool noted to be brown and watery consistency, skin dry warm and intact. 20g IV established on left ac, labs drawn and sent. 14fr indwelling hylton inserted utilizing aseptic technique. Fall precautions in place. Endorsed to primary RN

## 2022-01-03 NOTE — ED PROVIDER NOTE - CLINICAL SUMMARY MEDICAL DECISION MAKING FREE TEXT BOX
66y M w/ PMHx Osteomyelitis of lumbar spine, liver cirrhosis, CKD, T2DM currently on Amphotericin for fungemia (Candida through 1/17/22) BIBEMS from [facility] for decreased PO intake, refusal of PO medications. Patient dry-appearing, poor dentition, moaning in pain on stretcher, abd diffusely tender. Unable to obtain hx from pt, will obtain sepsis w/u, CT A/P, CXR, EKG, aggressive IVF, will require admission for FTT.

## 2022-01-04 NOTE — PROGRESS NOTE ADULT - PROBLEM SELECTOR PLAN 3
incentive spirometry when able  SpO2 % on RA, not meeting criteria for steroids or remdesivir  inflammatory markers pending  f/u official read of cxr  isolation precautions    #DVT ppx  -holding pharmacologic DVT ppx pending CTH -incentive spirometry when able  -SpO2 % on RA, not meeting criteria for steroids or remdesivir  -inflammatory markers pending  -CXR negative  isolation precautions    #DVT ppx  -holding pharmacologic DVT ppx pending CTH

## 2022-01-04 NOTE — H&P ADULT - PROBLEM SELECTOR PLAN 3
incentive spirometry when able  SpO2 % on RA, not meeting criteria for steroids or remdesivir  inflammatory markers pending  f/u official read of cxr  isolation precautions    #DVT ppx  -holding pharmacologic DVT ppx pending CTH

## 2022-01-04 NOTE — PROGRESS NOTE ADULT - PROBLEM SELECTOR PLAN 6
likely multifactorial in setting of sepsis/fungemia, electrolyte abnormality/dehydration, cirrhosis   reportedly A&Ox2 at baseline  check ammonia, TSH, Vit B12  CTH pending  fall, aspiration precautions  NPO for now, dysphagia screen when able, eventual S&S eval  PT consult likely multifactorial in setting of sepsis/fungemia, electrolyte abnormality/dehydration, cirrhosis   reportedly A&Ox2 at baseline  check ammonia, TSH, Vit B12  CTH pending  fall, aspiration precautions  NPO for now, dysphagia screen when able,   Refused S&S participation  PT consult

## 2022-01-04 NOTE — H&P ADULT - PROBLEM SELECTOR PLAN 2
ID consult in AM for recs re amphotericin in light of elevated BUN/Cr ID consult in AM for recs re amphotericin

## 2022-01-04 NOTE — SWALLOW BEDSIDE ASSESSMENT ADULT - COMMENTS
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 Patient noted to be hypotensive as well w/VS prior to transfer: 97.6  108  88/47  17  97%RA. Patient also noted to have decreased level of consciousness, generalized weakness. I was able to reach provider, Wen, at [facility] who reports patient A&Ox2 at baseline, no falls, is being treated for Candida parapsilosis, arrived at their facility 12/29."    WBC is elevated. Most recent CXR revealed "No focal consolidation"     Attempted to see patient at bedside, however, patient receiving nursing care at this time. This department to follow up as schedule permits.
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     SLP received patient upright in bed, asleep but easily aroused. Patient non-verbal throughout the assessment and unable to follow 1-step directives. Patient uncooperative throughout assessment.

## 2022-01-04 NOTE — CONSULT NOTE ADULT - SUBJECTIVE AND OBJECTIVE BOX
[facility]   MD DEMARCUS NORMAN MD, PA INJUNG KO NP    TEL:  OFFICE: [phone]  DR AGUAYO CELL: [phone]  DR. 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 Patient noted to be hypotensive as well w/VS prior to transfer: 97.6  108  88/47  17  97%RA. Patient also noted to have decreased level of consciousness, generalized weakness. 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 1000 milliLiter(s) (50 mL/Hr) IV Continuous <Continuous>  dextrose 5%. 1000 milliLiter(s) (100 mL/Hr) IV Continuous <Continuous>  dextrose 50% Injectable 25 Gram(s) IV Push once  dextrose 50% Injectable 12.5 Gram(s) IV Push once  dextrose 50% Injectable 25 Gram(s) IV Push once  glucagon  Injectable 1 milliGRAM(s) IntraMuscular once  insulin lispro (ADMELOG) corrective regimen sliding scale   SubCutaneous every 6 hours  sodium chloride 0.45% 1000 milliLiter(s) (75 mL/Hr) IV Continuous <Continuous>      SOCIAL HISTORY:  Denies ETOh, Smoking,     FAMILY HISTORY:      REVIEW OF SYSTEMS:  unable to obtain, not following commends     VITALS:  T(F): 97.5 (22 @ 11:52), Max: 99.6 (22 @ 20:20)  HR: 99 (22 @ 11:52)  BP: 117/70 (22 @ 11:52)  RR: 18 (22 @ 11:52)  SpO2: 100% (22 @ 11:52)  Wt(kg): --    Height (cm): 170.2 ( @ 04:45)  Weight (kg): 57.2 ( @ 04:45)  BMI (kg/m2): 19.7 ( 04:45)  BSA (m2): 1.66 ( @ 04:45)    PHYSICAL EXAM:  Constitutional: NAD  HEENT: anicteric sclera, oropharynx clear, MMM  Neck: No JVD  Respiratory: CTAB, no wheezes, rales or rhonchi  Cardiovascular: S1, S2, RRR  Gastrointestinal: BS+, soft, NT/ND  Extremities: No peripheral edema, No cyanosis or clubbing.   Neurological: confused, drowsy,   Psychiatric: unable to assess   : No CVA tenderness. No hylton.   Skin: dry   Vascular Access: not on HD     LABS:      152<H>  |  118<H>  |  59<H>  ----------------------------<  205<H>  3.6   |  16<L>  |  3.68<H>    Ca    8.4      2022 08:10  Phos  5.1       Mg     1.90         TPro  6.0  /  Alb  2.3<L>  /  TBili  1.2  /  DBili      /  AST  18  /  ALT  13  /  AlkPhos  231<H>      Creatinine Trend: 3.68 <--, 3.81 <--                        10.9   12.81 )-----------( 96       ( 2022 20:23 )             34.8     Urine Studies:  Urinalysis Basic - ( 2022 20:56 )    Color: Yellow / Appearance: Clear / S.019 / pH:   Gluc:  / Ketone: Negative  / Bili: Negative / Urobili: <2 mg/dL   Blood:  / Protein: 30 mg/dL / Nitrite: Negative   Leuk Esterase: Negative / RBC: 2 /HPF / WBC 10-20 /HPF   Sq Epi:  / Non Sq Epi: 1 /HPF / Bacteria: Few          RADIOLOGY & ADDITIONAL STUDIES:                 [facility] [place] [facility]   MD DEMARCUS NORMAN MD, PA INJUNG KO NP    TEL:  OFFICE: [phone]  DR AGUAYO CELL: [phone]  DR. 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 Patient noted to be hypotensive as well w/VS prior to transfer: 97.6  108  88/47  17  97%RA. Patient also noted to have decreased level of consciousness, generalized weakness. 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 1000 milliLiter(s) (50 mL/Hr) IV Continuous <Continuous>  dextrose 5%. 1000 milliLiter(s) (100 mL/Hr) IV Continuous <Continuous>  dextrose 50% Injectable 25 Gram(s) IV Push once  dextrose 50% Injectable 12.5 Gram(s) IV Push once  dextrose 50% Injectable 25 Gram(s) IV Push once  glucagon  Injectable 1 milliGRAM(s) IntraMuscular once  insulin lispro (ADMELOG) corrective regimen sliding scale   SubCutaneous every 6 hours  sodium chloride 0.45% 1000 milliLiter(s) (75 mL/Hr) IV Continuous <Continuous>      SOCIAL HISTORY:  could not be obtained    FAMILY HISTORY: could not be obatined      REVIEW OF SYSTEMS:  unable to obtain, not following commends     VITALS:  T(F): 97.5 (22 @ 11:52), Max: 99.6 (22 @ 20:20)  HR: 99 (22 @ 11:52)  BP: 117/70 (22 @ 11:52)  RR: 18 (22 @ 11:52)  SpO2: 100% (22 @ 11:52)  Wt(kg): --    Height (cm): 170.2 (:45)  Weight (kg): 57.2 ( 04:45)  BMI (kg/m2): 19.7 (:45)  BSA (m2): 1.66 (:45)    PHYSICAL EXAM:  Constitutional: NAD  HEENT: anicteric sclera, oropharynx clear, MMM  Neck: No JVD  Respiratory: CTAB, no wheezes, rales or rhonchi  Cardiovascular: S1, S2, RRR  Gastrointestinal: BS+, soft, NT/ND  Extremities: No peripheral edema, No cyanosis or clubbing.   Neurological: confused, drowsy,   Psychiatric: unable to assess   : No CVA tenderness. No hylton.   Skin: dry        LABS:      152<H>  |  118<H>  |  59<H>  ----------------------------<  205<H>  3.6   |  16<L>  |  3.68<H>    Ca    8.4      2022 08:10  Phos  5.1       Mg     1.90         TPro  6.0  /  Alb  2.3<L>  /  TBili  1.2  /  DBili      /  AST  18  /  ALT  13  /  AlkPhos  231<H>      Creatinine Trend: 3.68 <--, 3.81 <--                        10.9   12.81 )-----------( 96       ( 2022 20:23 )             34.8     Urine Studies:  Urinalysis Basic - ( 2022 20:56 )    Color: Yellow / Appearance: Clear / S.019 / pH:   Gluc:  / Ketone: Negative  / Bili: Negative / Urobili: <2 mg/dL   Blood:  / Protein: 30 mg/dL / Nitrite: Negative   Leuk Esterase: Negative / RBC: 2 /HPF / WBC 10-20 /HPF   Sq Epi:  / Non Sq Epi: 1 /HPF / Bacteria: Few          RADIOLOGY & ADDITIONAL STUDIES:

## 2022-01-04 NOTE — CONSULT NOTE ADULT - ASSESSMENT
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), cirrhosis (w/ ascites and gastric varices), T2DM, anemia, thrombocytopenia, CKD, and lumbar OM, presented from NH due to poor PO intake and removal of midline.    INCOMPLETE NOTE 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), cirrhosis (w/ ascites and gastric varices), T2DM, anemia, thrombocytopenia, CKD, and prior vertebral OM, presented from NH due to poor PO intake and removal of midline.    Afebrile  WBC 12.8K  UA: 10-20 WBCs  RVP: COVID positive  CT Abd/Pelv:  diffuse colonic/rectal wall thickening, +ascites    Impression:  #Candidemia - prior blood cultures reportedly growing candida parapsilosis on prior admission at OSH. Patient on amphotericin, but unclear why as c. parapsilosis is typically sensitive to azoles/echinocandins. Given renal dysfunction, will switch to caspofungin  #COVID - currently on RA, no respiratory symptoms    Recs:  - start caspofungin 70mg IV load and then 50mg IV q24H for prior candidemia  - f/u blood cultures  - check TTE  - obtain OSH records   - no indication for remdesivir/dexamethasone at this time  - monitor temp, WBCs      d/w primary team    Marquise Schroeder MD PGY-4  Infectious Disease Fellow  Available on Microsoft Teams  Pager: [phone]  Before 9AM or after 5PM: [phone]

## 2022-01-04 NOTE — H&P ADULT - PROBLEM SELECTOR PLAN 5
likely in setting of poor PO intake/dehydration  s/p IVF in ED, repeat labs pending to determine plan for further IVF

## 2022-01-04 NOTE — PROGRESS NOTE ADULT - PROBLEM SELECTOR PLAN 1
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

## 2022-01-04 NOTE — CHART NOTE - NSCHARTNOTEFT_GEN_A_CORE
INTERVAL HPI:   Spoke to patient's daughter Venita who informed me about patient's HPI - patient had been complaining of back pain this past September and was found to have osteomyelitis. He was hospitalized at [facility] for appx. 10 days and was d/c'd on IV antibiotics to be received by infusion daily. His condition worsened while on the antibiotics and he was hospitalized in early November where he was found to have ATN 2/2 the antibiotic used. His creatinine was elevated as high as 5-6 and he was placed on HD while inpatient which he received through catheter in his neck. No fistula was created as patient had no hx of CKD at that time, and his kidney function improved such that he was to be monitored off dialysis on d/c to rehab (last HD was appx. 2 weeks ago per daughter). The patient remained inpatient for two months and was only discharged to [facility] 1-2 weeks ago. Per his daughter, the patient developed candidemia that was present for ~1 month during the two-month hospitalization, with the source believed to be from the temporary HD catheter. He was started on amphotericin B and had negative cultures at some point and was told to continue the medication into January, however he allegedly was refusing the medication while at rehab and was documented in pulling his midline out and it is unclear how long it has been since his last dose.    His daughter also mentioned the patient was found to have cirrhosis of the liver of unclear etiology given that the patient was not a heavy alcohol drinker. He has had paracentesis done in the past to drain the ascites.

## 2022-01-04 NOTE — PROGRESS NOTE ADULT - PROBLEM SELECTOR PLAN 7
unknown baseline Cr  elevated BUN/Cr  monitor/trend renal function  renally dose meds, avoid nephrotoxins  monitor I/Os  would confirm baseline Cr or Cr from last hospital discharge in AM - Baseline Cr .82 as of 8/2021, per daughter pt. received Abx treatment for OM that gave patient ATN with Cr 5-6 requiring HD in November/December, pt off HD for appx. 2 weeks.  -Cr elevated to 3.81  monitor/trend renal function  renally dose meds, avoid nephrotoxins  monitor I/Os

## 2022-01-04 NOTE — H&P ADULT - PROBLEM SELECTOR PLAN 1
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, also now (+)COVID  patient also with a history of chronic osteo, not currently on abx  IV placed in ED (Patient had pulled his midline at his facility)  will need ID consult this AM regarding antifungal, unable to reach overnight fellow   f/u BCx, UCx  (+)WBC negative LE/N on UA, s/p CTX in ED, holding further abx for now, f/u ID recs  fungal Cx ordered  trend leukocytosis and lactate (lactate downtrended on repeat)  CT as above, possible colitis, no abdominal TTP on my exam, monitor for diarrhea  VS Q4H 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

## 2022-01-04 NOTE — H&P ADULT - ASSESSMENT
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

## 2022-01-04 NOTE — PROGRESS NOTE ADULT - PROBLEM SELECTOR PLAN 8
check ammonia  when passes dysphagia screen, add back lactulose  dose meds for decreased hepatic function  lactate may take some time to clear, would favor NS over LR if further IVF needed -check ammonia  -refused S&S, will give rectal lactulose  -dose meds for decreased hepatic function  -prev hx paracentesis in past, unclear etiology of cirrhosis.

## 2022-01-04 NOTE — PROGRESS NOTE ADULT - SUBJECTIVE AND OBJECTIVE BOX
Jose Kang MD  Internal Medicine PGY-1  Pager NS: [phone] // [facility]: [phone]    PROGRESS NOTE:     Patient is a 66y old  Male who presents with a chief complaint of poor PO intake/FTT (2022 04:01)      SUBJECTIVE / OVERNIGHT EVENTS:    No acute events overnight. Patient examined at bedside with no acute complaints.     REVIEW OF SYSTEMS:    CONSTITUTIONAL: No weakness, fevers or chills  EYES/ENT: No visual changes;  No vertigo or throat pain   NECK: No pain or stiffness  RESPIRATORY: No cough, wheezing, hemoptysis; No shortness of breath  CARDIOVASCULAR: No chest pain or palpitations  GASTROINTESTINAL: No abdominal or epigastric pain. No nausea, vomiting, or hematemesis; No diarrhea or constipation. No melena or hematochezia.  GENITOURINARY: No dysuria, frequency or hematuria  NEUROLOGICAL: No numbness or weakness  SKIN: No itching, rashes      MEDICATIONS  (STANDING):  dextrose 40% Gel 15 Gram(s) Oral once  dextrose 5%. 1000 milliLiter(s) (50 mL/Hr) IV Continuous <Continuous>  dextrose 5%. 1000 milliLiter(s) (100 mL/Hr) IV Continuous <Continuous>  dextrose 50% Injectable 25 Gram(s) IV Push once  dextrose 50% Injectable 12.5 Gram(s) IV Push once  dextrose 50% Injectable 25 Gram(s) IV Push once  glucagon  Injectable 1 milliGRAM(s) IntraMuscular once  insulin lispro (ADMELOG) corrective regimen sliding scale   SubCutaneous every 6 hours    MEDICATIONS  (PRN):      CAPILLARY BLOOD GLUCOSE      POCT Blood Glucose.: 105 mg/dL (2022 18:16)    I&O's Summary      VITALS:   T(C): 36.3 (22 @ 06:30), Max: 37.6 (22 @ 20:20)  HR: 104 (22 @ 06:30) (96 - 118)  BP: 127/62 (22 @ 06:30) (90/54 - 137/65)  RR: 19 (22 @ 06:30) (18 - 19)  SpO2: 99% (22 @ 06:30) (97% - 99%)    GENERAL: NAD, lying in bed comfortably  HEAD:  Atraumatic, normocephalic  EYES: EOMI, PERRLA, conjunctiva and sclera clear  ENT: Moist mucous membranes  NECK: Supple, no JVD  HEART: Regular rate and rhythm, no murmurs, rubs, or gallops  LUNGS: Unlabored respirations.  Clear to auscultation bilaterally, no crackles, wheezing, or rhonchi  ABDOMEN: Soft, nontender, nondistended, +BS  EXTREMITIES: 2+ peripheral pulses bilaterally. No clubbing, cyanosis, or edema  NERVOUS SYSTEM:  A&Ox3, no focal deficits   SKIN: No rashes or lesions    LABS:                        10.9   12.81 )-----------( 96       ( 2022 20:23 )             34.8         153<H>  |  116<H>  |  55<H>  ----------------------------<  128<H>  3.8   |  19<L>  |  3.81<H>    Ca    8.8      2022 20:23    TPro  6.9  /  Alb  2.7<L>  /  TBili  1.5<H>  /  DBili  x   /  AST  20  /  ALT  11  /  AlkPhos  275<H>  -    PT/INR - ( 2022 20:23 )   PT: 15.1 sec;   INR: 1.34 ratio         PTT - ( 2022 20:23 )  PTT:31.7 sec      Urinalysis Basic - ( 2022 20:56 )    Color: Yellow / Appearance: Clear / S.019 / pH: x  Gluc: x / Ketone: Negative  / Bili: Negative / Urobili: <2 mg/dL   Blood: x / Protein: 30 mg/dL / Nitrite: Negative   Leuk Esterase: Negative / RBC: 2 /HPF / WBC 10-20 /HPF   Sq Epi: x / Non Sq Epi: 1 /HPF / Bacteria: Few          RADIOLOGY & ADDITIONAL TESTS:  Results Reviewed:   Imaging Personally Reviewed:  Electrocardiogram Personally Reviewed:    COORDINATION OF CARE:  Care Discussed with Consultants/Other Providers [Y/N]:  Prior or Outpatient Records Reviewed [Y/N]:   Jose Kang MD  Internal Medicine PGY-1  Pager NS: [phone] // [facility]: [phone]    PROGRESS NOTE:     Patient is a 66y old  Male who presents with a chief complaint of poor PO intake/FTT (2022 04:01)      INTERVAL HPI:   Spoke to patient's daughter Venita who informed me about patient's HPI - patient had been complaining of back pain this past September and was found to have osteomyelitis. He was hospitalized at [facility] for appx. 10 days and was d/c'd on IV antibiotics to be received by infusion daily. His condition worsened while on the antibiotics and he was hospitalized in early November where he was found to have ATN 2/2 the antibiotic used. His creatinine was elevated as high as 5-6 and he was placed on HD while inpatient which he received through catheter in his neck. No fistula was created as patient had no hx of CKD at that time, and his kidney function improved such that he was to be monitored off dialysis on d/c to rehab (last HD was appx. 2 weeks ago per daughter). The patient remained inpatient for two months and was only discharged to [facility] 1-2 weeks ago. Per his daughter, the patient developed candidemia that was present for ~1 month during the two-month hospitalization, with the source believed to be from the temporary HD catheter. He was started on amphotericin B and had negative cultures at some point and was told to continue the medication into January, however he allegedly was refusing the medication while at rehab and was documented in pulling his midline out and it is unclear how long it has been since his last dose.    His daughter also mentioned the patient was found to have cirrhosis of the liver of unclear etiology given that the patient was not a heavy alcohol drinker. He has had paracentesis done in the past to drain the ascites.    SUBJECTIVE / OVERNIGHT EVENTS:    Patient seen lying in bed on his side, responsive to voice but unable to answer questions, A&Ox0 and shivering. He had just had a bowel movement - no blood seen.    REVIEW OF SYSTEMS:    CONSTITUTIONAL: No weakness, fevers or chills  EYES/ENT: No visual changes;  No vertigo or throat pain   NECK: No pain or stiffness  RESPIRATORY: No cough, wheezing, hemoptysis; No shortness of breath  CARDIOVASCULAR: No chest pain or palpitations  GASTROINTESTINAL: No abdominal or epigastric pain. No nausea, vomiting, or hematemesis; No diarrhea or constipation. No melena or hematochezia.  GENITOURINARY: No dysuria, frequency or hematuria  NEUROLOGICAL: No numbness or weakness  SKIN: No itching, rashes      MEDICATIONS  (STANDING):  dextrose 40% Gel 15 Gram(s) Oral once  dextrose 5%. 1000 milliLiter(s) (50 mL/Hr) IV Continuous <Continuous>  dextrose 5%. 1000 milliLiter(s) (100 mL/Hr) IV Continuous <Continuous>  dextrose 50% Injectable 25 Gram(s) IV Push once  dextrose 50% Injectable 12.5 Gram(s) IV Push once  dextrose 50% Injectable 25 Gram(s) IV Push once  glucagon  Injectable 1 milliGRAM(s) IntraMuscular once  insulin lispro (ADMELOG) corrective regimen sliding scale   SubCutaneous every 6 hours    MEDICATIONS  (PRN):      CAPILLARY BLOOD GLUCOSE      POCT Blood Glucose.: 105 mg/dL (2022 18:16)    I&O's Summary      VITALS:   T(C): 36.3 (22 @ 06:30), Max: 37.6 (22 @ 20:20)  HR: 104 (22 @ 06:30) (96 - 118)  BP: 127/62 (22 @ 06:30) (90/54 - 137/65)  RR: 19 (22 @ 06:30) (18 - 19)  SpO2: 99% (22 @ 06:30) (97% - 99%)    GENERAL: NAD, lying in bed comfortably  HEAD:  Atraumatic, normocephalic  EYES: EOMI, PERRLA, conjunctiva and sclera clear  ENT: Moist mucous membranes  NECK: Supple, no JVD  HEART: Regular rate and rhythm, no murmurs, rubs, or gallops  LUNGS: Unlabored respirations.  Clear to auscultation bilaterally, no crackles, wheezing, or rhonchi  ABDOMEN: Soft, nontender, nondistended, +BS  EXTREMITIES: 2+ peripheral pulses bilaterally. No clubbing, cyanosis, or edema  NERVOUS SYSTEM:  A&Ox3, no focal deficits   SKIN: No rashes or lesions    LABS:                        10.9   12.81 )-----------( 96       ( 2022 20:23 )             34.8         153<H>  |  116<H>  |  55<H>  ----------------------------<  128<H>  3.8   |  19<L>  |  3.81<H>    Ca    8.8      2022 20:23    TPro  6.9  /  Alb  2.7<L>  /  TBili  1.5<H>  /  DBili  x   /  AST  20  /  ALT  11  /  AlkPhos  275<H>      PT/INR - ( 2022 20:23 )   PT: 15.1 sec;   INR: 1.34 ratio         PTT - ( 2022 20:23 )  PTT:31.7 sec      Urinalysis Basic - ( 2022 20:56 )    Color: Yellow / Appearance: Clear / S.019 / pH: x  Gluc: x / Ketone: Negative  / Bili: Negative / Urobili: <2 mg/dL   Blood: x / Protein: 30 mg/dL / Nitrite: Negative   Leuk Esterase: Negative / RBC: 2 /HPF / WBC 10-20 /HPF   Sq Epi: x / Non Sq Epi: 1 /HPF / Bacteria: Few          RADIOLOGY & ADDITIONAL TESTS:  Results Reviewed:   Imaging Personally Reviewed:  Electrocardiogram Personally Reviewed:    COORDINATION OF CARE:  Care Discussed with Consultants/Other Providers [Y/N]:  Prior or Outpatient Records Reviewed [Y/N]:   Jose aKng MD  Internal Medicine PGY-1  Pager NS: [phone] // [facility]: [phone]    PROGRESS NOTE:     Patient is a 66y old  Male who presents with a chief complaint of poor PO intake/FTT (2022 04:01)    INTERVAL HPI:   Spoke to patient's daughter Venita who informed me about patient's HPI - patient had been complaining of back pain this past September and was found to have osteomyelitis. He was hospitalized at [facility] for appx. 10 days and was d/c'd on IV antibiotics to be received by infusion daily. His condition worsened while on the antibiotics and he was hospitalized in early November where he was found to have ATN 2/2 the antibiotic used. His creatinine was elevated as high as 5-6 and he was placed on HD while inpatient which he received through catheter in his neck. No fistula was created as patient had no hx of CKD at that time, and his kidney function improved such that he was to be monitored off dialysis on d/c to rehab (last HD was appx. 2 weeks ago per daughter). The patient remained inpatient for two months and was only discharged to [facility] 1-2 weeks ago. Per his daughter, the patient developed candidemia that was present for ~1 month during the two-month hospitalization, with the source believed to be from the temporary HD catheter. He was started on amphotericin B and had negative cultures at some point and was told to continue the medication into January, however he allegedly was refusing the medication while at rehab and was documented in pulling his midline out and it is unclear how long it has been since his last dose.    His daughter also mentioned the patient was found to have cirrhosis of the liver of unclear etiology given that the patient was not a heavy alcohol drinker. He has had paracentesis done in the past to drain the ascites.    SUBJECTIVE / OVERNIGHT EVENTS:    Patient seen lying in bed on his side, responsive to voice but unable to answer questions, A&Ox0 and shivering. He had just had a bowel movement - no blood seen.    REVIEW OF SYSTEMS: unable to be obtained due to patient's AMS.      MEDICATIONS  (STANDING):  dextrose 40% Gel 15 Gram(s) Oral once  dextrose 5%. 1000 milliLiter(s) (50 mL/Hr) IV Continuous <Continuous>  dextrose 5%. 1000 milliLiter(s) (100 mL/Hr) IV Continuous <Continuous>  dextrose 50% Injectable 25 Gram(s) IV Push once  dextrose 50% Injectable 12.5 Gram(s) IV Push once  dextrose 50% Injectable 25 Gram(s) IV Push once  glucagon  Injectable 1 milliGRAM(s) IntraMuscular once  insulin lispro (ADMELOG) corrective regimen sliding scale   SubCutaneous every 6 hours    MEDICATIONS  (PRN):      CAPILLARY BLOOD GLUCOSE      POCT Blood Glucose.: 105 mg/dL (2022 18:16)    I&O's Summary      VITALS:   T(C): 36.3 (22 @ 06:30), Max: 37.6 (22 @ 20:20)  HR: 104 (22 @ 06:30) (96 - 118)  BP: 127/62 (22 @ 06:30) (90/54 - 137/65)  RR: 19 (22 @ 06:30) (18 - 19)  SpO2: 99% (22 @ 06:30) (97% - 99%)    GENERAL: NAD, lying in bed on side, shivering and mumbling, gaunt appearance  HEAD:  Atraumatic, normocephalic  EYES: EOMI, PERRLA, conjunctiva and sclera clear  ENT: Dry mucous membranes  HEART: tachycardic, normal rhythm, no murmurs, rubs, or gallops  LUNGS: Unlabored respirations.  Clear to auscultation bilaterally, no crackles, wheezing, or rhonchi  ABDOMEN: Soft, nontender, nondistended, +BS  EXTREMITIES: 2+ peripheral pulses bilaterally. No clubbing, cyanosis, or edema  NERVOUS SYSTEM:  A&Ox0, responsive to verbal and painful stimuli.   SKIN: No rashes or lesions    LABS:                        10.9   12.81 )-----------( 96       ( 2022 20:23 )             34.8     01-    153<H>  |  116<H>  |  55<H>  ----------------------------<  128<H>  3.8   |  19<L>  |  3.81<H>    Ca    8.8      2022 20:23    TPro  6.9  /  Alb  2.7<L>  /  TBili  1.5<H>  /  DBili  x   /  AST  20  /  ALT  11  /  AlkPhos  275<H>      PT/INR - ( 2022 20:23 )   PT: 15.1 sec;   INR: 1.34 ratio         PTT - ( 2022 20:23 )  PTT:31.7 sec      Urinalysis Basic - ( 2022 20:56 )    Color: Yellow / Appearance: Clear / S.019 / pH: x  Gluc: x / Ketone: Negative  / Bili: Negative / Urobili: <2 mg/dL   Blood: x / Protein: 30 mg/dL / Nitrite: Negative   Leuk Esterase: Negative / RBC: 2 /HPF / WBC 10-20 /HPF   Sq Epi: x / Non Sq Epi: 1 /HPF / Bacteria: Few          RADIOLOGY & ADDITIONAL TESTS:  Results Reviewed:   Imaging Personally Reviewed:  Electrocardiogram Personally Reviewed:    COORDINATION OF CARE:  Care Discussed with Consultants/Other Providers [Y/N]:  Prior or Outpatient Records Reviewed [Y/N]:

## 2022-01-04 NOTE — PHYSICAL THERAPY INITIAL EVALUATION ADULT - ACTIVE RANGE OF MOTION EXAMINATION, REHAB EVAL
as observed by spontaneous movement, pt. with difficulty following commands./bilateral upper extremity Active ROM was WFL (within functional limits)/bilateral  lower extremity Active ROM was WFL (within functional limits)

## 2022-01-04 NOTE — H&P ADULT - PROBLEM SELECTOR PLAN 7
unknown baseline Cr  elevated BUN/Cr  monitor/trend renal function  renally dose meds, avoid nephrotoxins  monitor I/Os  would confirm baseline Cr or Cr from last hospital discharge in AM

## 2022-01-04 NOTE — SWALLOW BEDSIDE ASSESSMENT ADULT - SWALLOW EVAL: DIAGNOSIS
Assessment of oropharyngeal swallow was unable to be completed, as patient demonstrated refusal for all PO trials. Puree and thin liquids brought to patient's lips, at which time patient gritted teeth and turned head away stating "no" despite maximum verbal cues from clinician.

## 2022-01-04 NOTE — CONSULT NOTE ADULT - SUBJECTIVE AND OBJECTIVE BOX
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 Patient noted to be hypotensive as well w/VS prior to transfer: 97.6  108  88/47  17  97%RA. Patient also noted to have decreased level of consciousness, generalized weakness. Per provider Wen at [facility], reports patient A&Ox2 at baseline, no falls, is being treated for Candida parapsilosis fungemia, arrived at their facility .  In the ED VS: 99.6  105-118  /54-66  18-19  97-98%RA, received ceftriaxone 1g IVPB x1 and 2L LR IVF (2022 04:01)     ID consulted for candida parapsilosis fungemia. Currently afebrile.       REVIEW OF SYSTEMS  [ x ] ROS unobtainable because:  AMS  [  ] All other systems negative except as noted below    Constitutional:  [ ] fever [ ] chills  [ ] weight loss  [ ]night sweat  [ ]poor appetite/PO intake [ ]fatigue   Skin:  [ ] rash [ ] phlebitis	  Eyes: [ ] icterus [ ] pain  [ ] discharge	  ENMT: [ ] sore throat  [ ] thrush [ ] ulcers [ ] exudates [ ]anosmia  Respiratory: [ ] dyspnea [ ] hemoptysis [ ] cough [ ] sputum	  Cardiovascular:  [ ] chest pain [ ] palpitations [ ] edema	  Gastrointestinal:  [ ] nausea [ ] vomiting [ ] diarrhea [ ] constipation [ ] pain	  Genitourinary:  [ ] dysuria [ ] frequency [ ] hematuria [ ] discharge [ ] flank pain  [ ] incontinence  Musculoskeletal:  [ ] myalgias [ ] arthralgias [ ] arthritis  [ ] back pain  Neurological:  [ ] headache [ ] weakness [ ] seizures  [ ] confusion/altered mental status    prior hospital charts reviewed [V]  primary team notes reviewed [V]  other consultant notes reviewed [V]    PAST MEDICAL & SURGICAL HISTORY:  Candidemia, 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  Liver cirrhosis  Chronic osteomyelitis of lumbar spine  Type 2 diabetes mellitus, with long-term current use of insulin  Normocytic anemia  Thrombocytopenia  Hypernatremia  Neutropenia  Gastric varices  Cognitive communication deficit  Muscle weakness (generalized)  Unspecified lack of coordination  Chronic kidney disease, unspecified CKD stage      FAMILY HISTORY:  Unable to obtain due to mental status    SOCIAL HISTORY:  Unable to obtain due to mental status    Allergies  daptomycin (Unknown)  ertapenem (Unknown)        ANTIMICROBIALS:      ANTIMICROBIALS (past 90 days):   MEDICATIONS  (STANDING):  cefTRIAXone   IVPB   100 mL/Hr IV Intermittent (22 @ 22:44)        MEDICATIONS  (STANDING):  dextrose 40% Gel 15 once  dextrose 50% Injectable 25 once  dextrose 50% Injectable 12.5 once  dextrose 50% Injectable 25 once  glucagon  Injectable 1 once  insulin lispro (ADMELOG) corrective regimen sliding scale  every 6 hours      VITALS:  Vital Signs Last 24 Hrs  T(F): 97.5 (22 @ 11:52), Max: 99.6 (22 @ 20:20)  Vital Signs Last 24 Hrs  HR: 99 (22 @ 11:52) (96 - 118)  BP: 117/70 (22 @ 11:52) (90/54 - 137/65)  RR: 18 (22 @ 11:52)  SpO2: 100% (22 @ 11:52) (97% - 100%)  Wt(kg): --    PHYSICAL EXAM:  Constitutional: non-toxic, no distress  HEAD/EYES: anicteric, no conjunctival injection  ENT:  supple, no thrush  Cardiovascular:   normal S1/S2, no murmur, no edema  Respiratory:  clear BS bilaterally, no wheezes, no rales  GI:  soft, non-tender, normal bowel sounds  :  no hylton, no CVA tenderness  Musculoskeletal:  no synovitis, normal ROM  Neurologic:   Skin:  no rash, no erythema, no phlebitis  Heme/Onc: no lymphadenopathy   Psychiatric:        Labs:                        10.9   12.81 )-----------( 96       ( 2022 20:23 )             34.8     -    152<H>  |  118<H>  |  59<H>  ----------------------------<  205<H>  3.6   |  16<L>  |  3.68<H>    Ca    8.4      2022 08:10  Phos  5.1     01-04  Mg     1.90         TPro  6.0  /  Alb  2.3<L>  /  TBili  1.2  /  DBili  x   /  AST  18  /  ALT  13  /  AlkPhos  231<H>        WBC Trend:  WBC Count: 12.81 (22 @ 20:23)    Auto Neutrophil #: 10.36 K/uL (22 @ 20:23)    Auto Eosinophil %: 0.0 % (22 @ 20:23)    Urinalysis Basic - ( 2022 20:56 )    Color: Yellow / Appearance: Clear / S.019 / pH: x  Gluc: x / Ketone: Negative  / Bili: Negative / Urobili: <2 mg/dL   Blood: x / Protein: 30 mg/dL / Nitrite: Negative   Leuk Esterase: Negative / RBC: 2 /HPF / WBC 10-20 /HPF   Sq Epi: x / Non Sq Epi: 1 /HPF / Bacteria: Few        MICROBIOLOGY:  Rapid RVP Result: Detected ( @ 21:14)        RADIOLOGY:  imaging below personally reviewed    < from: CT Abdomen and Pelvis No Cont (22 @ 23:36) >  IMPRESSION:  Patchy groundglass opacities of the lung bases, are nonspecific. May be   infectious/inflammatory in etiology. Covid 19 is in the differential   diagnosis.  Cirrhosis and evidence of portal hypertension. Moderate ascites.  Diffuse thickening of the colon and rectum may be secondary to collapsed   with a secondary to cirrhosis, however underlying colitis cannot be   excluded. Recommend clinical correlation.  Abnormal appearance of L4-L5 suggesting underlying osteomyelitis of L4   and L5. Recommend clinical correlation. This can be further evaluated   with MRI of the lumbar spine with and without intravenous contrast.  < end of copied text >      < from: Xray Chest 1 View- PORTABLE-Urgent (22 @ 20:33) >  IMPRESSION:  No focal consolidation.  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 Patient noted to be hypotensive as well w/VS prior to transfer: 97.6  108  88/47  17  97%RA. Patient also noted to have decreased level of consciousness, generalized weakness. Per provider Wen at [facility], reports patient A&Ox2 at baseline, no falls, is being treated for Candida parapsilosis fungemia, arrived at their facility .  In the ED VS: 99.6  105-118  /54-66  18-19  97-98%RA, received ceftriaxone 1g IVPB x1 and 2L LR IVF (2022 04:01)     ID consulted for prior candida parapsilosis fungemia at OSH. Currently afebrile and HD stable. Unable to provide history.       REVIEW OF SYSTEMS  [ x ] ROS unobtainable because:  AMS  [  ] All other systems negative except as noted below    Constitutional:  [ ] fever [ ] chills  [ ] weight loss  [ ]night sweat  [ ]poor appetite/PO intake [ ]fatigue   Skin:  [ ] rash [ ] phlebitis	  Eyes: [ ] icterus [ ] pain  [ ] discharge	  ENMT: [ ] sore throat  [ ] thrush [ ] ulcers [ ] exudates [ ]anosmia  Respiratory: [ ] dyspnea [ ] hemoptysis [ ] cough [ ] sputum	  Cardiovascular:  [ ] chest pain [ ] palpitations [ ] edema	  Gastrointestinal:  [ ] nausea [ ] vomiting [ ] diarrhea [ ] constipation [ ] pain	  Genitourinary:  [ ] dysuria [ ] frequency [ ] hematuria [ ] discharge [ ] flank pain  [ ] incontinence  Musculoskeletal:  [ ] myalgias [ ] arthralgias [ ] arthritis  [ ] back pain  Neurological:  [ ] headache [ ] weakness [ ] seizures  [ ] confusion/altered mental status    prior hospital charts reviewed [V]  primary team notes reviewed [V]  other consultant notes reviewed [V]    PAST MEDICAL & SURGICAL HISTORY:  Candidemia, 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  Liver cirrhosis  Chronic osteomyelitis of lumbar spine  Type 2 diabetes mellitus, with long-term current use of insulin  Normocytic anemia  Thrombocytopenia  Hypernatremia  Neutropenia  Gastric varices  Cognitive communication deficit  Muscle weakness (generalized)  Unspecified lack of coordination  Chronic kidney disease, unspecified CKD stage      FAMILY HISTORY:  Unable to obtain due to mental status    SOCIAL HISTORY:  Unable to obtain due to mental status    Allergies  daptomycin (Unknown)  ertapenem (Unknown)        ANTIMICROBIALS:      ANTIMICROBIALS (past 90 days):   MEDICATIONS  (STANDING):  cefTRIAXone   IVPB   100 mL/Hr IV Intermittent (22 @ 22:44)        MEDICATIONS  (STANDING):  dextrose 40% Gel 15 once  dextrose 50% Injectable 25 once  dextrose 50% Injectable 12.5 once  dextrose 50% Injectable 25 once  glucagon  Injectable 1 once  insulin lispro (ADMELOG) corrective regimen sliding scale  every 6 hours      VITALS:  Vital Signs Last 24 Hrs  T(F): 97.5 (22 @ 11:52), Max: 99.6 (22 @ 20:20)  Vital Signs Last 24 Hrs  HR: 99 (22 @ 11:52) (96 - 118)  BP: 117/70 (22 @ 11:52) (90/54 - 137/65)  RR: 18 (22 @ 11:52)  SpO2: 100% (22 @ 11:52) (97% - 100%)  Wt(kg): --    PHYSICAL EXAM:  Constitutional: non-toxic, no distress  HEAD/EYES: anicteric, no conjunctival injection  ENT:   no thrush  Cardiovascular:   +S1/S2  Respiratory:  +BS bilaterally  GI:  distended, non-tender, +bowel sounds  :  no hylton, no CVA tenderness  Musculoskeletal:  no joint swelling  Neurologic: awake, not oriented, unable to follow commands, contracted  Skin:  no rash, no erythema, no phlebitis  Heme/Onc: no cervical lymphadenopathy   Psychiatric:  non-verbal      Labs:                        10.9   12.81 )-----------( 96       ( 2022 20:23 )             34.8     -    152<H>  |  118<H>  |  59<H>  ----------------------------<  205<H>  3.6   |  16<L>  |  3.68<H>    Ca    8.4      2022 08:10  Phos  5.1       Mg     1.90         TPro  6.0  /  Alb  2.3<L>  /  TBili  1.2  /  DBili  x   /  AST  18  /  ALT  13  /  AlkPhos  231<H>        WBC Trend:  WBC Count: 12.81 (22 @ 20:23)    Auto Neutrophil #: 10.36 K/uL (22 @ 20:23)    Auto Eosinophil %: 0.0 % (22 @ 20:23)    Urinalysis Basic - ( 2022 20:56 )    Color: Yellow / Appearance: Clear / S.019 / pH: x  Gluc: x / Ketone: Negative  / Bili: Negative / Urobili: <2 mg/dL   Blood: x / Protein: 30 mg/dL / Nitrite: Negative   Leuk Esterase: Negative / RBC: 2 /HPF / WBC 10-20 /HPF   Sq Epi: x / Non Sq Epi: 1 /HPF / Bacteria: Few        MICROBIOLOGY:  Rapid RVP Result: Detected ( @ 21:14)        RADIOLOGY:  imaging below personally reviewed    < from: CT Abdomen and Pelvis No Cont (22 @ 23:36) >  IMPRESSION:  Patchy groundglass opacities of the lung bases, are nonspecific. May be   infectious/inflammatory in etiology. Covid 19 is in the differential   diagnosis.  Cirrhosis and evidence of portal hypertension. Moderate ascites.  Diffuse thickening of the colon and rectum may be secondary to collapsed   with a secondary to cirrhosis, however underlying colitis cannot be   excluded. Recommend clinical correlation.  Abnormal appearance of L4-L5 suggesting underlying osteomyelitis of L4   and L5. Recommend clinical correlation. This can be further evaluated   with MRI of the lumbar spine with and without intravenous contrast.  < end of copied text >      < from: Xray Chest 1 View- PORTABLE-Urgent (22 @ 20:33) >  IMPRESSION:  No focal consolidation.  < end of copied text >

## 2022-01-04 NOTE — CONSULT NOTE ADULT - SUBJECTIVE AND OBJECTIVE BOX
Neurology Consult    Reason for Consult: Patient is a 66y old  Male who presents with a chief complaint of poor PO intake/FTT (2022 13:54)      HPI:  66-year-old male with candida fungemia 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, IDDM2, cirrhosis w/gastric varices and anemia/thrombocytopenia, CKD, lumbar OM, presenting from [facility] due to poor PO intake, refusal of PO medications and removal of midline 1 day after insertion with refusal of reinsertion, and ID eval. Patient noted to be hypotensive as well w/VS prior to transfer: 97.6  108  88/47  17  97%RA. Patient also noted to have decreased level of consciousness, generalized weakness. I was able to reach provider, Wen, at [facility] who reports patient A&Ox2 at baseline, no falls, is being treated for Candida parapsilosis, arrived at their facility .    PSH: unable to obtain secondary to mental status  FamHx: unable to obtain secondary to mental status    In the ED VS: 99.6  105-118  /54-66  18-19  97-98%RA, received ceftriaxone 1g IVPB x1 and 2L LR IVF (2022 04:01)       PAST MEDICAL & SURGICAL HISTORY:  Candidemia  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    Liver cirrhosis    Chronic osteomyelitis of lumbar spine    Type 2 diabetes mellitus, with long-term current use of insulin    Normocytic anemia    Thrombocytopenia    Hypernatremia    Neutropenia    Gastric varices    Cognitive communication deficit    Muscle weakness (generalized)    Unspecified lack of coordination    Chronic kidney disease, unspecified CKD stage        Allergies: Allergies    daptomycin (Unknown)  ertapenem (Unknown)    Intolerances        Social History: Denies toxic habits including tobacco, ETOH or illicit drugs.    Family History: FAMILY HISTORY:  . No family history of strokes    Medications: MEDICATIONS  (STANDING):  caspofungin IVPB 70 milliGRAM(s) IV Intermittent once  caspofungin IVPB 50 milliGRAM(s) IV Intermittent every 24 hours  dextrose 40% Gel 15 Gram(s) Oral once  dextrose 5%. 1000 milliLiter(s) (50 mL/Hr) IV Continuous <Continuous>  dextrose 5%. 1000 milliLiter(s) (100 mL/Hr) IV Continuous <Continuous>  dextrose 50% Injectable 25 Gram(s) IV Push once  dextrose 50% Injectable 12.5 Gram(s) IV Push once  dextrose 50% Injectable 25 Gram(s) IV Push once  glucagon  Injectable 1 milliGRAM(s) IntraMuscular once  insulin lispro (ADMELOG) corrective regimen sliding scale   SubCutaneous every 6 hours  lactulose Retention Enema 200 Gram(s) Rectal once  sodium chloride 0.45% 1000 milliLiter(s) (75 mL/Hr) IV Continuous <Continuous>    MEDICATIONS  (PRN):      Review of Systems:  unable given mental status     Vitals:  Vital Signs Last 24 Hrs  T(C): 36.4 (2022 11:52), Max: 37.6 (2022 20:20)  T(F): 97.5 (2022 11:52), Max: 99.6 (2022 20:20)  HR: 99 (2022 11:52) (96 - 118)  BP: 117/70 (2022 11:52) (90/54 - 137/65)  BP(mean): --  RR: 18 (2022 11:52) (18 - 19)  SpO2: 100% (2022 11:52) (97% - 100%)    General Exam:   General Appearance: Appropriately dressed and in no acute distress       Head: Normocephalic, atraumatic and no dysmorphic features  Ear, Nose, and Throat: Moist mucous membranes  CVS: S1S2+  Resp: No SOB, no wheeze or rhonchi  GI: soft NT/ND  Extremities: No edema or cyanosis  Skin: No bruises or rashes     Neurological Exam:  Mental Status: Awake, alert and oriented x 0-1.  no verbal. not following but tries to mimics.   Cranial Nerves: PERRL, EOMI, VFFC, sensation V1-V3 intact,  R? facial asymmetry, equal elevation of palate, scm/trap 5/5, tongue is midline on protrusion.   hearing is grossly intact.   Motor: generlzied weakness. moving L>R and uppers > lowerse   Sensation: withdraws x4  Reflexes: 1+ throughout at biceps, brachioradialis, triceps, patellars and ankles bilaterally and equal. No clonus.   Coordination: unable   Gait: unable     Data/Labs/Imaging which I personally reviewed.     Labs:     CBC Full  -  ( 2022 20:23 )  WBC Count : 12.81 K/uL  RBC Count : 3.70 M/uL  Hemoglobin : 10.9 g/dL  Hematocrit : 34.8 %  Platelet Count - Automated : 96 K/uL  Mean Cell Volume : 94.1 fL  Mean Cell Hemoglobin : 29.5 pg  Mean Cell Hemoglobin Concentration : 31.3 gm/dL  Auto Neutrophil # : 10.36 K/uL  Auto Lymphocyte # : 1.34 K/uL  Auto Monocyte # : 1.01 K/uL  Auto Eosinophil # : 0.00 K/uL  Auto Basophil # : 0.01 K/uL  Auto Neutrophil % : 80.8 %  Auto Lymphocyte % : 10.5 %  Auto Monocyte % : 7.9 %  Auto Eosinophil % : 0.0 %  Auto Basophil % : 0.1 %        152<H>  |  118<H>  |  59<H>  ----------------------------<  205<H>  3.6   |  16<L>  |  3.68<H>    Ca    8.4      2022 08:10  Phos  5.1       Mg     1.90         TPro  6.0  /  Alb  2.3<L>  /  TBili  1.2  /  DBili  x   /  AST  18  /  ALT  13  /  AlkPhos  231<H>      LIVER FUNCTIONS - ( 2022 08:10 )  Alb: 2.3 g/dL / Pro: 6.0 g/dL / ALK PHOS: 231 U/L / ALT: 13 U/L / AST: 18 U/L / GGT: x           PT/INR - ( 2022 20:23 )   PT: 15.1 sec;   INR: 1.34 ratio         PTT - ( 2022 20:23 )  PTT:31.7 sec  Urinalysis Basic - ( 2022 20:56 )    Color: Yellow / Appearance: Clear / S.019 / pH: x  Gluc: x / Ketone: Negative  / Bili: Negative / Urobili: <2 mg/dL   Blood: x / Protein: 30 mg/dL / Nitrite: Negative   Leuk Esterase: Negative / RBC: 2 /HPF / WBC 10-20 /HPF   Sq Epi: x / Non Sq Epi: 1 /HPF / Bacteria: Few

## 2022-01-04 NOTE — PATIENT PROFILE ADULT - FALL HARM RISK - HARM RISK INTERVENTIONS
Assistance with ambulation/Assistance OOB with selected safe patient handling equipment/Communicate Risk of Fall with Harm to all staff/Discuss with provider need for PT consult/Monitor gait and stability/Reinforce activity limits and safety measures with patient and family/Sit up slowly, dangle for a short time, stand at bedside before walking/Tailored Fall Risk Interventions/Visual Cue: Yellow wristband and red socks/Bed in lowest position, wheels locked, appropriate side rails in place/Call bell, personal items and telephone in reach/Instruct patient to call for assistance before getting out of bed or chair/Non-slip footwear when patient is out of bed/[place] to call system/Physically safe environment - no spills, clutter or unnecessary equipment/Purposeful Proactive Rounding/Room/bathroom lighting operational, light cord in reach

## 2022-01-04 NOTE — CONSULT NOTE ADULT - ASSESSMENT
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 Patient noted to be hypotensive as well w/VS prior to transfer: 97.6  108  88/47  17  97%RA. Patient also noted to have decreased level of consciousness, generalized weakness. Nephrology consulted for ANAHI     A/P    ANAHI   etiology?   likely pre-renal, but need to r/o ATN    Ordered ANAHI work up, check urine cr, urine na, renal ultrasound,   bladder scan to r/o urinary retention   looks dehydrated; hydrate with 1/2n/s + 75meq bicarb 75cc/hr x24hr   monitor BMP, U/O  Avoid further nephrotoxic medications, NSAIDS RCA     Lactic acidosis   etiology? likely 2/2 poor perfusion   start  1/2n/s + 75meq bicarb 75cc/hr x24hr     Hypernatremia   likely 2/2 dehydration   1/2n/s + 75meq bicarb 75cc/hr x24hr   ordered urine na, urine osmol  66-year-old male with candida fungemia on amphotericin through 1/17/22, IDDM2, cirrhosis w/gastric varices and anemia/thrombocytopenia, CKD, lumbar OM, presenting from [facility] due to poor PO intake, refusal of PO medications and removal of midline 1 day after insertion with refusal of reinsertion, and ID eval. Patient noted to be hypotensive as well w/VS prior to transfer: 97.6  108  88/47  17  97%RA. Patient also noted to have decreased level of consciousness, generalized weakness. Nephrology consulted for ANAHI     A/P    ANAHI   etiology?   likely pre-renal, but need to r/o ATN    get urine cr, urine na, renal ultrasound,   bladder scan to r/o urinary retention   looks dehydrated; hydrate with 1/2n/s + 75meq bicarb 75cc/hr x24hr   monitor BMP, U/O  Avoid further nephrotoxic medications, NSAIDS RCA     Lactic acidosis   etiology? likely 2/2 poor perfusion   start  1/2n/s + 75meq bicarb 75cc/hr x24hr     Hypernatremia   likely 2/2 dehydration   1/2n/s + 75meq bicarb 75cc/hr x24hr   ordered urine na, urine osmol  66-year-old male with candida fungemia on amphotericin through 1/17/22, IDDM2, cirrhosis w/gastric varices and anemia/thrombocytopenia, CKD, lumbar OM, presenting from [facility] due to poor PO intake, refusal of PO medications and removal of midline 1 day after insertion with refusal of reinsertion, and ID eval. Patient noted to be hypotensive as well w/VS prior to transfer: 97.6  108  88/47  17  97%RA. Patient also noted to have decreased level of consciousness, generalized weakness. Nephrology consulted for ANAHI     A/P    ANAHI   etiology?   likely pre-renal, but need to r/o ATN    get urine cr, urine na, renal ultrasound,   bladder scan to r/o urinary retention   clinically dehydrated; hydrate with 1/2n/s + 75meq bicarb 75cc/hr x24hr   monitor BMP, U/O  Avoid further nephrotoxic medications, NSAIDS RCA     Lactic acidosis   etiology? likely 2/2 poor perfusion   start  1/2n/s + 75meq bicarb 75cc/hr x24hr     Hypernatremia   likely 2/2 dehydration   1/2n/s + 75meq bicarb 75cc/hr x24hr   Get urine na, urine osmol

## 2022-01-04 NOTE — ED ADULT NURSE REASSESSMENT NOTE - NS ED NURSE REASSESS COMMENT FT1
Pt a&ox0, resting in bed, changed and repositioned, moisture barrier cream applied to buttocks region. Respirations are even and unlabored, no signs of respiratory distress. Fluids completed. n/a

## 2022-01-04 NOTE — H&P ADULT - PROBLEM SELECTOR PLAN 8
check ammonia  when passes dysphagia screen, add back lactulose  dose meds for decreased hepatic function  lactate may take some time to clear, would favor NS over LR if further IVF needed check ammonia  when passes dysphagia screen, add back lactulose  dose meds for decreased hepatic function  lactate may take some time to clear, would favor NS over LR if further IVF needed  MELD 24

## 2022-01-04 NOTE — H&P ADULT - PROBLEM SELECTOR PLAN 6
likely multifactorial in setting of sepsis/fungemia, electrolyte abnormality/dehydration, cirrhosis   reportedly A&Ox2 at baseline  check ammonia, TSH, Vit B12  CTH pending  fall, aspiration precautions  NPO for now, dysphagia screen when able, eventual S&S eval  PT consult

## 2022-01-04 NOTE — PHYSICAL THERAPY INITIAL EVALUATION ADULT - DISCHARGE DISPOSITION, PT EVAL
Pt. is not appropriate for skilled, therapeutic PT intervention at this time. Pt. with difficulty following commands and unable to actively participate in PT. Pt. will be discharged from PT program. Please re-consult when appropriate.

## 2022-01-04 NOTE — H&P ADULT - NSHPPHYSICALEXAM_GEN_ALL_CORE
Vital Signs Last 24 Hrs  T(C): 36.4 (04 Jan 2022 04:11), Max: 37.6 (03 Jan 2022 20:20)  T(F): 97.5 (04 Jan 2022 04:11), Max: 99.6 (03 Jan 2022 20:20)  HR: 105 (04 Jan 2022 04:11) (105 - 118)  BP: 137/65 (04 Jan 2022 04:11) (90/54 - 137/65)  BP(mean): --  RR: 18 (04 Jan 2022 04:11) (18 - 19)  SpO2: 99% (04 Jan 2022 04:11) (97% - 99%) Vital Signs Last 24 Hrs  T(C): 36.4 (04 Jan 2022 04:11), Max: 37.6 (03 Jan 2022 20:20)  T(F): 97.5 (04 Jan 2022 04:11), Max: 99.6 (03 Jan 2022 20:20)  HR: 105 (04 Jan 2022 04:11) (105 - 118)  BP: 137/65 (04 Jan 2022 04:11) (90/54 - 137/65)  BP(mean): --  RR: 18 (04 Jan 2022 04:11) (18 - 19)  SpO2: 99% (04 Jan 2022 04:11) (97% - 99%)    PHYSICAL EXAM:  GENERAL: well-developed, thin, intermittently moaning  HEAD:  Atraumatic, temporal wasting  EYES: conjunctiva and sclera clear, unable to fully assess secondary to patient not cooperating with exam  NECK: No LAD  CHEST/LUNG: Clear to auscultation bilaterally; No wheezes, rales or rhonchi  HEART: Regular rate and rhythm; No murmurs, rubs, or gallops, (+)S1, S2  ABDOMEN: Soft, Nontender, Nondistended; Hypoactive Bowel sounds; (+)fluid wave  EXTREMITIES:  2+ Peripheral Pulses, No clubbing, cyanosis, or edema  PSYCH: unable to assess, A&Ox0, alert to verbal stimuli, not speaking on my exam, not following commands  NEUROLOGY: unable to assess, moving all extremities, no focal deficits, non-verbal  SKIN: No rashes or lesions

## 2022-01-04 NOTE — H&P ADULT - PROBLEM SELECTOR PROBLEM 7
Type 2 diabetes mellitus, with long-term current use of insulin Acute kidney injury superimposed on CKD

## 2022-01-04 NOTE — PHYSICAL THERAPY INITIAL EVALUATION ADULT - PATIENT PROFILE REVIEW, REHAB EVAL
Activity - Increase as tolerated. Spoke with Layne CLEMENT RN, pt. ok to be seen for PT Evaluation./yes

## 2022-01-04 NOTE — PROGRESS NOTE ADULT - PROBLEM SELECTOR PLAN 5
likely in setting of poor PO intake/dehydration  s/p IVF in ED, repeat labs pending to determine plan for further IVF likely in setting of poor PO intake/dehydration  s/p IVF in ED, repeat labs pending for 4PM  - per nephro placed on 1/2NS with 75 bicarb @ 75cc/hr for 24 hours

## 2022-01-04 NOTE — PATIENT PROFILE ADULT - DOES PATIENT HAVE ADVANCE DIRECTIVE
Patient: PROBUS,WILLIAM DENVER     Acct: [id number]     Report: #[id number]  UNIT #: [id number]     : 1939    Encounter Date:2021  PRIMARY CARE: MARIA ELENA MOORE  ***Signed***  --------------------------------------------------------------------------------------------------------------------  NURSE INTAKE      Visit Type      Established Patient Visit            Chief Complaint      BLADDER CA F/U            Referring Provider/Copies To      Referring Provider:  Antonino Dunn      Primary Care Provider:  MARIA ELENA MOORE      Copies To:   MARIA ELENA MOORE            History and Present Illness      Past Oncology Illness History      Patient returns to the clinic to discuss immunotherapy options. Patient is a     very active 79 yo with biopsy-proven invasive, urothelial carcinoma in the left     later wall of the bladder performed on 20 by Dr. Dunn.  Patient reports     that he had bleeding in the urine with fei blood for 5 to 6 days.  He was seen    twice in the emergency room at his local hospital however they were unable to     control the symptoms.  For this reason he was referred to [facility]    on 2020.  CT scan demonstrated a large clot/mass in the bladder.  He was     seen by urology and taken for cystoscopy with TURBT on 2020.  This revealed     muscle invasive urothelial carcinoma.              HPI - Oncology Interim      Patient returns for ongoing treatment of his bladder cancer.  He is on Tecentriq    and due for cycle 2.  He notes that he tolerated his initial cycle well.  He     does report some constipation.  He also notes lower extremity swelling and was     recently started on Lasix.  He reports occasional heartburn but takes Tums to     good effect.            Cancer Details            20 biopsy-proven invasive, urothelial carcinoma in the left later wall      of the bladder performed by Dr. Dunn 10/23/2020 High grade urothelial      carcinoma  invading into lamina propria and muscularis propria            Clinical Staging      T2, N0, MX            Treatments      Surgeries      4/6/2020 TURBT  10/23/20 TURBT            Clinical Trial Participant      No            ECOG Performance Status      0            Most Recent Lab Findings            Item Value  Date Time             Sodium Level 135 mmol/L 1/20/21 1007             Potassium Level 4.4 mmol/L 1/20/21 1007             Chloride Level 100 mmol/L 1/20/21 1007             Carbon Dioxide Level 26 mmol/L 1/20/21 1007             Anion Gap 13 mmol/L 1/20/21 1007             Blood Urea Nitrogen 18 mg/dL 1/20/21 1007             Creatinine 1.13 mg/dL 1/20/21 1007             Glomerular Filtration Rate Calc >60 mL/min/{1.73_m2} 1/20/21 1007             BUN/Creatinine Ratio 16 {ratio} 1/20/21 1007             Glucose Level 80 mg/dL 1/20/21 1007             Calculated Osmolality 281 1/20/21 1007             Calcium Level 9.4 mg/dL 1/20/21 1007             Total Bilirubin 0.35 mg/dL 1/20/21 1007             Aspartate Amino Transf (AST/SGOT) 14 U/L L 1/20/21 1007             Alanine Aminotransferase 10 U/L 1/20/21 1007             Alkaline Phosphatase 86 U/L 1/20/21 1007             Total Protein 7.2 g/dL 1/20/21 1007             Albumin 4.0 g/dL 1/20/21 1007             Globulin 3.2 g/dL 1/20/21 1007             Albumin/Globulin Ratio 1.3 {ratio} L 1/20/21 1007            Item Value  Date Time             White Blood Count 7.14 10*3/uL 1/20/21 1015             Hemoglobin 14.8 g/dL 1/20/21 1015             Red Blood Count 5.24 10*6/uL 1/20/21 1015             Hematocrit 45.4 % 1/20/21 1015             Mean Corpuscular Volume 86.6 fL 1/20/21 1015             Mean Corpuscular Hemoglobin 28.2 pg 1/20/21 1015             Mean Corpuscular Hemoglobin Concent 32.6 L 1/20/21 1015             Red Cell Distribution Width 14.5 % 1/20/21 1015             Platelet Count 197 10*3/uL 1/20/21 1015             RDW Standard  Deviation 46.0 fL 21 1015             Mean Platelet Volume 9.5 fL 21 1015             Granulocytes (%) 54.5 % 21 1015            PAST, FAMILY   Past Medical History      Past Medical History:  Bleeding Condition, COPD, Diabetes Type 2, Heart Attack,     High Cholesterol, Hypertension, Stroke      Other PMH:        Asthma      CHF      Hematology/Oncology (M):  Bladder Cancer            Past Surgical History            5 vessel CABG       carotid endarterectomy      COLON RESECTION            Family History      Family History:  Lung Cancer            mother  at 81 with complications of broken hip from a fall      Father  at 78 with lung cancer      sister  during child birth in her 20's            Social History      Lives independently:  Yes      Number of Children:  2      Occupation:  retired      Other Social History:        retired from [employer] for 26 years      very active and volunteers            Tobacco Use      Tobacco status:  Former smoker            Substance Use      Substance use:  Denies use            Additional  Information      Additional History:        quit smoking in ; smoked 1 ppd      patient cares for his wife with dementia            REVIEW OF SYSTEMS      General:  Denies: Fatigue, Fever      Eye:  Denies Blurred Vision      ENT:  Denies Headache, Denies Hearing Loss      Respiratory:  Admits: Shortness of Air      Musculoskeletal:  Denies Muscle Pain, Denies Painful Joints      Hematologic/Lymphatic:  Denies Bleeding, Denies Enlarged Lymph Nodes            VITAL SIGNS AND SCORES      Vitals      Weight 225 lbs 4.962 oz / 102.2 kg      Temperature 97.4 F / 36.33 C - Temporal      Pulse 75      Respirations 20      Blood Pressure 140/70 Sitting      Pulse Oximetry 95%, RM AAIR            Pain Score      Pain Scale Used:  Numerical      Pain Intensity:  0            Fatigue Score      Fatigue (0-10 scale):  0 (none)            EXAM      General Appearance:   Positive for: Alert, Cooperative;          Negative for: Acute Distress      Eye:  Positive for: Anicteric Sclerae, Moist Conjunctiva      Neck:  Positive for: Supple;          Negative for: JVD, Masses      Respiratory:  Positive for: CTAB, Normal Respiratory Effort      Abdomen/Gastro:  Positive for: Normal Active Bowel Sounds, Soft;          Negative for: Distention, Hepatosplenomegaly, Tenderness      Cardiovascular:  Positive for: Peripheral Edema (2+ pitting edema bilateral), RR    R;          Negative for: Gallop, Murmur, Rub      Psychiatric:  Positive for: Appropriate Affect, Intact Judgement      Lymphatic:  Negative for: Cervical, Infraclavicular, Supraclavicular            PREVENTION      Hx Influenza Vaccination:  No      Influenza Vaccine Declined:  No      2 or More Falls in Past Year?:  No      Fall Past Year with Injury?:  No      Hx Pneumococcal Vaccination:  Yes      Encouraged to follow-up with:  PCP regarding preventative exams.      Chart initiated by      CAILXTO PHILLIPS MA            ALLERGY/MEDS      Allergies      Coded Allergies:             ATENOLOL (Verified  Allergy, Unknown, does not remember, 1/20/21)           CLONIDINE (Verified  Allergy, Unknown, does not remember , 1/20/21)           HYDRALAZINE (Verified  Allergy, Unknown, does not remember, 1/20/21)           LEVOFLOXACIN (Verified  Allergy, Unknown, does not remember, 1/20/21)           PRASUGREL (Verified  Allergy, Unknown, does not remember, 1/20/21)           RISPERIDONE (Verified  Allergy, Unknown, does not remember, 1/20/21)           GRASS POLLEN-PERENNIAL RYE, STANDAR (Verified  Adverse Reaction, Severe,     hives, 1/20/21)           CLOPIDOGREL (Verified  Adverse Reaction, Unknown, ITCHING , 1/20/21)           DOXYCYCLINE (Verified  Adverse Reaction, Unknown, diarrhea, 1/20/21)           PREDNISONE (Verified  Adverse Reaction, Unknown, diarrhea, 1/20/21)           TICAGRELOR (Verified  Adverse Reaction, Unknown, 1/20/21)       Uncoded Allergies:             feathers chicken (Allergy, Severe, hives, shortness of air. rash., 8/20/13)           flu shot (Allergy, Severe, shortness of air. hives rash., 8/20/13)           dust (Adverse Reaction, Severe, sneezes, 8/20/13)           leaves (Adverse Reaction, Severe, hives, 8/20/13)           mold (Adverse Reaction, Severe, hives, 8/20/13)            Medications      Last Reconciled on 1/20/21 16:09 by JOSE ALMEIDA      Aspirin Chew (Aspirin Baby) 81 Mg Tab.chew      81 MG PO QDAY, #30 TAB.CHEW 0 Refills         Reported         10/16/20       Losartan Potassium (Losartan*) 25 Mg Tablet      25 MG PO HS, #60 TAB 0 Refills         Reported         6/28/20       Montelukast Sodium (Montelukast*) 10 Mg Tablet      10 MG PO HS, TAB         Reported         6/28/20       amLODIPine (amLODIPine) 10 Mg Tablet      10 MG PO QAM, #30 TAB 0 Refills         Reported         6/28/20       Pravastatin Sodium (Pravastatin Sodium) 40 Mg Tablet      40 MG PO HS, #30 TAB 0 Refills         Reported         6/28/20       Metoprolol Succinate (Metoprolol Succinate) 100 Mg Tab.er.24h      100 MG PO QDAY, #30 TAB         Reported         6/28/20       Furosemide (Furosemide) 20 Mg Tablet      20 MG PO QDAY, #30 TAB 0 Refills         Reported         6/28/20      Medications Reviewed:  No Changes made to meds            IMPRESSION/PLAN      Diagnosis      Bladder cancer         Malignant neoplasm of urinary bladder, unspecified site         Bladder location: unspecified site      Patient is on Tecentriq.  He is due for cycle 2.  Tolerating well.  We discussed    stool softener as needed for constipation.  He should continue his Lasix as     prescribed by his PCP.  Lab work today is adequate for treatment.  Proceed with     cycle 2 as planned.  RTC 3 weeks for OV, cycle 3 with labs prior.            Pain      Pain Zero Today            Advanced Care Plan Discussion      Declines Discussion 1124F             Patient Education            Coping With Constipation Related to Chemotherapy      Patient Education Provided:  Yes            Electronically signed by JOSE ALMEIDA  01/20/2021 16:09       Disclaimer: Converted document may not contain table formatting or lab diagrams. Please see [employer] for the authenticated document.   No

## 2022-01-04 NOTE — H&P ADULT - NSHPADDITIONALINFOADULT_GEN_ALL_CORE
#GOC   -MOLST in chart signed filled out 12/30/21 and notes from facility reporting full code        Addendum - ID consulted, will see, f/u recs

## 2022-01-04 NOTE — PHYSICAL THERAPY INITIAL EVALUATION ADULT - ADDITIONAL COMMENTS
Pt. is nonverbal at this time. Pt. unable to provide information on social history or previous level of function. Per documentation, pt. admitted from NH, A+Ox2 at baseline.     Pt. was left in bed post PT Evaluation, no apparent distress. Layne PERALTA made aware.

## 2022-01-04 NOTE — CONSULT NOTE ADULT - ATTENDING COMMENTS
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), T2DM, cirrhosis (w/ gastric varices), and anemia/thrombocytopenia, CKD, lumbar OM, presenting from NH due to poor PO intake and removal of midline  Leukocytosis, no fever  COVID+  Outside diagnosis of Candidemia on treatment with Ampho B through 1/17  Now elevated Cr  Overall,  1) Candidemia  - Unclear history regarding candida parapsilosis fungemia  - Given ANAHI, DC Ampho B  - Caspofungin 70mg x1, then 50mg daily  - F/U pending BCXs  - Check TTE  2) COVID  - RA  - Hold on RemD/Dexa unless progress O2 requirements  - Supportive care  3) Leukocytosis  - Trend to normal  - Monitor for alternate signs infection    Jose Fox MD  Pager [phone]  From 5pm-9am, and on weekends call [phone]    I was physically present for the key portions of the evaluation and management service provided. I saw and examined the patient. I agree with the above history, physical, and plan except for any discrepancies which I have documented in “Attending Statements.” Please refer to “Attending Statements” for final plan. 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), T2DM, cirrhosis (w/ gastric varices), and anemia/thrombocytopenia, CKD, lumbar OM, presenting from NH due to poor PO intake and removal of midline  Leukocytosis, no fever  COVID+  Outside diagnosis of Candidemia on treatment with Ampho B through 1/17  Now elevated Cr  Overall,  1) Candidemia  - Unclear history regarding candida parapsilosis fungemia  - Given ANAHI, DC Ampho B  - Caspofungin 70mg x1, then 50mg daily  - F/U pending BCXs  - Check TTE  - Please obtain records regarding fungemia  2) COVID  - RA  - Hold on RemD/Dexa unless progress O2 requirements  - Supportive care  3) Leukocytosis  - Trend to normal  - Monitor for alternate signs infection    Jose Fox MD  Pager [phone]  From 5pm-9am, and on weekends call [phone]    I was physically present for the key portions of the evaluation and management service provided. I saw and examined the patient. I agree with the above history, physical, and plan except for any discrepancies which I have documented in “Attending Statements.” Please refer to “Attending Statements” for final plan.

## 2022-01-04 NOTE — H&P ADULT - HISTORY OF PRESENT ILLNESS
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 Patient noted to be hypotensive as well w/VS prior to transfer: 97.6  108  88/47  17  97%RA. Patient also noted to have decreased level of consciousness, generalized weakness.     In the ED VS: 99.6  105-118  /54-66  18-19  97-98%RA, received ceftriaxone 1g IVPB x1 and 2L LR IVF 66-year-old male with candida fungemia on amphotericin through 1/17/22, IDDM2, cirrhosis w/gastric varices and anemia/thrombocytopenia, CKD, lumbar OM, presenting from [facility] due to poor PO intake, refusal of PO medications and removal of midline 1 day after insertion with refusal of reinsertion, and ID eval. Patient noted to be hypotensive as well w/VS prior to transfer: 97.6  108  88/47  17  97%RA. Patient also noted to have decreased level of consciousness, generalized weakness. I was able to reach provider, Wen, at [facility] who reports patient A&Ox2 at baseline, no falls, is being treated for Candida parapsilosis, arrived at their facility 12/29.    PSH: unable to obtain secondary to mental status  FamHx: unable to obtain secondary to mental status    In the ED VS: 99.6  105-118  /54-66  18-19  97-98%RA, received ceftriaxone 1g IVPB x1 and 2L LR IVF

## 2022-01-04 NOTE — PROGRESS NOTE ADULT - PROBLEM SELECTOR PLAN 2
ID consult in AM for recs re amphotericin in light of elevated BUN/Cr -pt. with Hx candida parasilosis fungemia  -ID consulted, per recs will order 70mgx1 caspofungin and then 50mg daily.  -f/u BCX  -TTE  -will try and get more collateral regarding fungemia

## 2022-01-04 NOTE — H&P ADULT - NSICDXPASTMEDICALHX_GEN_ALL_CORE_FT
PAST MEDICAL HISTORY:  2019 novel coronavirus disease (COVID-19)     ANAHI (acute kidney injury)     Candidemia on amphotericin through 1/17/22    Chronic osteomyelitis of lumbar spine     Cognitive communication deficit     Gastric varices     Hypernatremia     Liver cirrhosis     Muscle weakness (generalized)     Need for prophylactic measure     Neutropenia     Normocytic anemia     Sepsis     Thrombocytopenia     Type 2 diabetes mellitus, with long-term current use of insulin     Unspecified lack of coordination

## 2022-01-04 NOTE — H&P ADULT - NSHPLABSRESULTS_GEN_ALL_CORE
10.9   12.81 )-----------( 96       ( 2022 20:23 )             34.8         153<H>  |  116<H>  |  55<H>  ----------------------------<  128<H>  3.8   |  19<L>  |  3.81<H>    Ca    8.8      2022 20:23    TPro  6.9  /  Alb  2.7<L>  /  TBili  1.5<H>  /  DBili  x   /  AST  20  /  ALT  11  /  AlkPhos  275<H>      PT/INR - ( 2022 20:23 )   PT: 15.1 sec;   INR: 1.34 ratio    PTT - ( 2022 20:23 )  PTT:31.7 sec    00:57 - VBG - pH: 7.38  | pCO2: 31    | pO2: 30    | Lactate: 4.1    20:23 - VBG - pH: 7.37  | pCO2: 33    | pO2: 29    | Lactate: 4.7      Urinalysis Basic - ( 2022 20:56 )  Color: Yellow / Appearance: Clear / S.019 / pH: x  Gluc: x / Ketone: Negative  / Bili: Negative / Urobili: <2 mg/dL   Blood: x / Protein: 30 mg/dL / Nitrite: Negative   Leuk Esterase: Negative / RBC: 2 /HPF / WBC 10-20 /HPF   Sq Epi: x / Non Sq Epi: 1 /HPF / Bacteria: Few    EKG personally reviewed - 107bpm sinus tach, biphasic T in III; low voltage; QTc 536ms 10.9   12.81 )-----------( 96       ( 2022 20:23 )             34.8         153<H>  |  116<H>  |  55<H>  ----------------------------<  128<H>  3.8   |  19<L>  |  3.81<H>    Ca    8.8      2022 20:23    TPro  6.9  /  Alb  2.7<L>  /  TBili  1.5<H>  /  DBili  x   /  AST  20  /  ALT  11  /  AlkPhos  275<H>      PT/INR - ( 2022 20:23 )   PT: 15.1 sec;   INR: 1.34 ratio    PTT - ( 2022 20:23 )  PTT:31.7 sec    00:57 - VBG - pH: 7.38  | pCO2: 31    | pO2: 30    | Lactate: 4.1    20:23 - VBG - pH: 7.37  | pCO2: 33    | pO2: 29    | Lactate: 4.7      Urinalysis Basic - ( 2022 20:56 )  Color: Yellow / Appearance: Clear / S.019 / pH: x  Gluc: x / Ketone: Negative  / Bili: Negative / Urobili: <2 mg/dL   Blood: x / Protein: 30 mg/dL / Nitrite: Negative   Leuk Esterase: Negative / RBC: 2 /HPF / WBC 10-20 /HPF   Sq Epi: x / Non Sq Epi: 1 /HPF / Bacteria: Few    < from: CT Abdomen and Pelvis No Cont (22 @ 23:36) >  LOWER CHEST: Several, small patchy groundglass opacities of the bilateral lung bases. Probable rounded atelectasis of the medial right lung base, adjacent to a large vertebral osteophyte. However, underlying neoplasm cannot be totally excluded.  LIVER: Cirrhosis.  BILE DUCTS: Normal caliber.  GALLBLADDER: Cholelithiasis.  SPLEEN: Splenomegaly.  PANCREAS: Within normal limits.  ADRENALS: Within normal limits.  KIDNEYS/URETERS: No renal stone or hydronephrosis.  BLADDER: Betancur catheter. Bladder is collapsed. Small amount of bladder air density, likely iatrogenic.  REPRODUCTIVE ORGANS: Prostate within normal limits.  BOWEL: 1.4 x 1.1 cm density to the right the distal esophagus, may represent a esophageal varix versus paraesophageal lymph node. Diffuse colonic wall/rectal wall thickening, may be portal colopathy versus colitis/proctitis. Overall, evaluation of bowel is limited without distention with oral contrast, however there is no bowel obstruction. Appendix is not visualized. No evidence of inflammation in the pericecal region.  PERITONEUM: Moderate ascites. No free intraperitoneal air.  VESSELS: Atherosclerotic changes. Patent paraumbilical vein.  RETROPERITONEUM/LYMPH NODES: No lymphadenopathy.  ABDOMINAL WALL: Small bilateral fat-containing inguinal hernias.  BONES: Degenerative changes of the spine. In addition, at L4/L5, there are bony erosive changes of the inferior endplate of L4 and superior endplate of L5 with subtle demineralization of both vertebral bodies, correlate with osteomyelitis.  IMPRESSION: Patchy groundglass opacities of the lung bases, are nonspecific. May be infectious/inflammatory in etiology. Covid 19 is in the differential diagnosis. Cirrhosis and evidence of portal hypertension. Moderate ascites. Diffuse thickening of the colon and rectum may be secondary to collapsed with a secondary to cirrhosis, however underlying colitis cannot be excluded. Recommend clinical correlation. Abnormal appearance of L4-L5 suggesting underlying osteomyelitis of L4 and L5. Recommend clinical correlation. This can be further evaluated with MRI of the lumbar spine with and without intravenous contrast.  < end of copied text >    CXR personally reviewed - no focal consolidations    EKG personally reviewed - 107bpm sinus tach, biphasic T in III; low voltage; QTc 536ms 10.9   12.81 )-----------( 96       ( 2022 20:23 )             34.8         153<H>  |  116<H>  |  55<H>  ----------------------------<  128<H>  3.8   |  19<L>  |  3.81<H>    Ca    8.8      2022 20:23    TPro  6.9  /  Alb  2.7<L>  /  TBili  1.5<H>  /  DBili  x   /  AST  20  /  ALT  11  /  AlkPhos  275<H>      PT/INR - ( 2022 20:23 )   PT: 15.1 sec;   INR: 1.34 ratio    PTT - ( 2022 20:23 )  PTT:31.7 sec    00:57 - VBG - pH: 7.38  | pCO2: 31    | pO2: 30    | Lactate: 4.1    20:23 - VBG - pH: 7.37  | pCO2: 33    | pO2: 29    | Lactate: 4.7      Urinalysis Basic - ( 2022 20:56 )  Color: Yellow / Appearance: Clear / S.019 / pH: x  Gluc: x / Ketone: Negative  / Bili: Negative / Urobili: <2 mg/dL   Blood: x / Protein: 30 mg/dL / Nitrite: Negative   Leuk Esterase: Negative / RBC: 2 /HPF / WBC 10-20 /HPF   Sq Epi: x / Non Sq Epi: 1 /HPF / Bacteria: Few    SARS-CoV-2: Detected (22 @ 21:14)    < from: CT Abdomen and Pelvis No Cont (22 @ 23:36) >  LOWER CHEST: Several, small patchy groundglass opacities of the bilateral lung bases. Probable rounded atelectasis of the medial right lung base, adjacent to a large vertebral osteophyte. However, underlying neoplasm cannot be totally excluded.  LIVER: Cirrhosis.  BILE DUCTS: Normal caliber.  GALLBLADDER: Cholelithiasis.  SPLEEN: Splenomegaly.  PANCREAS: Within normal limits.  ADRENALS: Within normal limits.  KIDNEYS/URETERS: No renal stone or hydronephrosis.  BLADDER: Betancur catheter. Bladder is collapsed. Small amount of bladder air density, likely iatrogenic.  REPRODUCTIVE ORGANS: Prostate within normal limits.  BOWEL: 1.4 x 1.1 cm density to the right the distal esophagus, may represent a esophageal varix versus paraesophageal lymph node. Diffuse colonic wall/rectal wall thickening, may be portal colopathy versus colitis/proctitis. Overall, evaluation of bowel is limited without distention with oral contrast, however there is no bowel obstruction. Appendix is not visualized. No evidence of inflammation in the pericecal region.  PERITONEUM: Moderate ascites. No free intraperitoneal air.  VESSELS: Atherosclerotic changes. Patent paraumbilical vein.  RETROPERITONEUM/LYMPH NODES: No lymphadenopathy.  ABDOMINAL WALL: Small bilateral fat-containing inguinal hernias.  BONES: Degenerative changes of the spine. In addition, at L4/L5, there are bony erosive changes of the inferior endplate of L4 and superior endplate of L5 with subtle demineralization of both vertebral bodies, correlate with osteomyelitis.  IMPRESSION: Patchy groundglass opacities of the lung bases, are nonspecific. May be infectious/inflammatory in etiology. Covid 19 is in the differential diagnosis. Cirrhosis and evidence of portal hypertension. Moderate ascites. Diffuse thickening of the colon and rectum may be secondary to collapsed with a secondary to cirrhosis, however underlying colitis cannot be excluded. Recommend clinical correlation. Abnormal appearance of L4-L5 suggesting underlying osteomyelitis of L4 and L5. Recommend clinical correlation. This can be further evaluated with MRI of the lumbar spine with and without intravenous contrast.  < end of copied text >    CXR personally reviewed - no focal consolidations    EKG personally reviewed - 107bpm sinus tach, biphasic T in III; low voltage; QTc 536ms

## 2022-01-04 NOTE — H&P ADULT - NSHPSOCIALHISTORY_GEN_ALL_CORE
Emergency contact #1:  Virginia Harrison (daughter) [phone]; Emergency contact #2: Jasemarilee Hunter (daughter) [phone] Emergency contact #1:  Virginiadakota Harrison (daughter) [phone]; Emergency contact #2: Venita Harrison (daughter) [phone]    Unable to obtain social history secondary to mental status

## 2022-01-04 NOTE — CONSULT NOTE ADULT - ASSESSMENT
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 AAO2 baseline.  o/e today R weakness, no verbal ?aphasic. gen weakness confused   B12 and TSH WNL   NH3 106  +thrombocytopenia  abnormal coags.   alk phos elevated   CRP elevated 145  +COVID  Ct abd with known L4/5 OM and lung disease     Impression: AMS likely multifactorial from toxic metabolic encephalopathy from infection on top of hepatic encephalopathy.  He does also seem to have weakness on R. rule out L hemisphere stroke  - check CTH pending   - c/w caspofungin for OM  - treatment of infection/covid per primary team  - monitor inflammatory markers and resp status   - lactulose  - check NH3   - if no improvement MRI brain and EEG  - consider ASA if CTH shows infarct. and statin therapy  - Hemoglobin A1c and lipid panel  - TTE  - telemetry  - PT/OT/SS/SLP, OOBC  - check FS, glucose control <180  - GI/DVT ppx  - Counseling on diet, exercise, and medication adherence was done  - Counseling on smoking cessation and alcohol consumption offered when appropriate.  - Pain assessed and judicious use of narcotics when appropriate was discussed.    - Stroke education given when appropriate.  - Importance of fall prevention discussed.   - Differential diagnosis and plan of care discussed with patient and/or family and primary team  - Thank you for allowing me to participate in the care of this patient. Call with questions.   Jr Garibay MD  Vascular Neurology  Office: [phone]

## 2022-01-05 NOTE — PROGRESS NOTE ADULT - ASSESSMENT
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), T2DM, cirrhosis (w/ gastric varices), and anemia/thrombocytopenia, CKD, lumbar OM, presenting from NH due to poor PO intake and removal of midline  Leukocytosis, no fever  COVID+  Outside diagnosis of Candidemia on treatment with Ampho B through 1/17  Now elevated Cr  Culture from OSH shows Candida S to Flu and Caspo  Overall,  1) Candidemia  - Appears S to caspo/Flu  - Caspofungin 50mg daily  - F/U pending BCXs  - Check TTE  2) COVID  - RA  - Hold on RemD/Dexa unless progress O2 requirements  - Supportive care  3) Leukocytosis  - Trend to normal  - Monitor for alternate signs infection    Jose Fox MD  Pager [phone]  From 5pm-9am, and on weekends call [phone] 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), T2DM, cirrhosis (w/ gastric varices), and anemia/thrombocytopenia, CKD, lumbar OM, presenting from NH due to poor PO intake and removal of midline  Leukocytosis, no fever  COVID+  Outside diagnosis of Candidemia on treatment with Ampho B through 1/17  Now elevated Cr  Culture from OSH shows Candida S to Flu and Caspo  Overall,  1) Candidemia  - Appears S to caspo/Flu  - Fluconazole 200mg q 24 (give every day but after HD on dialysis days)  - F/U pending BCXs  - Check TTE  2) COVID  - RA  - Hold on RemD/Dexa unless progress O2 requirements  - Supportive care  3) Leukocytosis  - Trend to normal  - Monitor for alternate signs infection    Jose Fox MD  Pager [phone]  From 5pm-9am, and on weekends call [phone]

## 2022-01-05 NOTE — PROGRESS NOTE ADULT - ASSESSMENT
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 AAO2 baseline.  o/e today R weakness, no verbal ?aphasic. gen weakness confused   B12 and TSH WNL   NH3 106  +thrombocytopenia  abnormal coags.   alk phos elevated   CRP elevated 145  +COVID  Ct abd with known L4/5 OM and lung disease   CTH unremarkable 1/5   Impression: AMS likely multifactorial from toxic metabolic encephalopathy from infection on top of hepatic encephalopathy.     - c/w caspofungin for OM  - treatment of infection/covid per primary team  - monitor inflammatory markers and resp status   - lactulose  - check NH3   - if no improvement MRI brain and EEG  - consider ASA if CTH shows infarct. and statin therapy  - Hemoglobin A1c and lipid panel  - TTE  - telemetry  - PT/OT/SS/SLP, OOBC  - check FS, glucose control <180  - GI/DVT ppx  - Differential diagnosis and plan of care discussed with patient and/or family and primary team  - Thank you for allowing me to participate in the care of this patient. Call with questions.   Jr Garibay MD  Vascular Neurology  Office: [phone]

## 2022-01-05 NOTE — PROGRESS NOTE ADULT - SUBJECTIVE AND OBJECTIVE BOX
CC: F/U for Fungemia    Saw/spoke to patient. Not able to interact well.    Allergies  daptomycin (Unknown)  ertapenem (Unknown)    ANTIMICROBIALS:  caspofungin IVPB 50 every 24 hours    PE:    Vital Signs Last 24 Hrs  T(C): 36.3 (2022 09:00), Max: 36.5 (2022 17:57)  T(F): 97.4 (2022 09:00), Max: 97.7 (2022 17:57)  HR: 90 (2022 09:00) (76 - 97)  BP: 102/64 (2022 09:00) (98/58 - 111/63)  RR: 18 (2022 09:00) (16 - 18)  SpO2: 100% (2022 09:00) (99% - 100%)    Gen: AOx3, NAD, non-toxic  CV: S1+S2 normal, nontachycardic  Resp: Clear bilat, no resp distress, no crackles/wheezes  Abd: Soft, nontender, +BS  Ext: No LE edema, no wounds    LABS:                        11.2   7.85  )-----------( 101      ( 2022 08:06 )             35.3     01-05    153<H>  |  117<H>  |  70<H>  ----------------------------<  160<H>  3.9   |  16<L>  |  4.16<H>    Ca    8.6      2022 08:06  Phos  5.2     01-05  Mg     2.10     01-05    TPro  6.7  /  Alb  2.4<L>  /  TBili  1.4<H>  /  DBili  x   /  AST  17  /  ALT  12  /  AlkPhos  249<H>  01-05    Urinalysis Basic - ( 2022 20:56 )    Color: Yellow / Appearance: Clear / S.019 / pH: x  Gluc: x / Ketone: Negative  / Bili: Negative / Urobili: <2 mg/dL   Blood: x / Protein: 30 mg/dL / Nitrite: Negative   Leuk Esterase: Negative / RBC: 2 /HPF / WBC 10-20 /HPF   Sq Epi: x / Non Sq Epi: 1 /HPF / Bacteria: Few    MICROBIOLOGY:    .Blood Blood-Peripheral  22   No growth to date.     Clean Catch Clean Catch (Midstream)  22   No growth     Rapid RVP Result: Detected ( @ 21:14) COVID+    RADIOLOGY:    1/3 CT:    IMPRESSION:  Patchy groundglass opacities of the lung bases, are nonspecific. May be   infectious/inflammatory in etiology. Covid 19 is in the differential   diagnosis.    Cirrhosis and evidence of portal hypertension. Moderate ascites.    Diffuse thickening of the colon and rectum may be secondary to collapsed   with a secondary to cirrhosis, however underlying colitis cannot be   excluded. Recommend clinical correlation.    Abnormal appearance of L4-L5 suggesting underlying osteomyelitis of L4   and L5. Recommend clinical correlation. This can be further evaluated   with MRI of the lumbar spine with and without intravenous contrast.

## 2022-01-05 NOTE — PROGRESS NOTE ADULT - SUBJECTIVE AND OBJECTIVE BOX
Jose Kang MD  Internal Medicine PGY-1  Pager NS: [phone] // [facility]: [phone]    PROGRESS NOTE:     Patient is a 66y old  Male who presents with a chief complaint of poor PO intake/FTT (2022 17:27)      SUBJECTIVE / OVERNIGHT EVENTS:    Spoke to nurse appx. 7PM last night after starting D5W on patient - per nurse patient had not actually received 1/2NS + bicarb fluids yesterday due to issue with obtaining fluids from pharmacy. Informed nurse to continue D5W at this time. No acute events overnight.     REVIEW OF SYSTEMS: unable to be obtained due to pt's mental status.      MEDICATIONS  (STANDING):  caspofungin IVPB 50 milliGRAM(s) IV Intermittent every 24 hours  dextrose 40% Gel 15 Gram(s) Oral once  dextrose 5%. 1000 milliLiter(s) (50 mL/Hr) IV Continuous <Continuous>  dextrose 5%. 1000 milliLiter(s) (75 mL/Hr) IV Continuous <Continuous>  dextrose 5%. 1000 milliLiter(s) (100 mL/Hr) IV Continuous <Continuous>  dextrose 50% Injectable 25 Gram(s) IV Push once  dextrose 50% Injectable 12.5 Gram(s) IV Push once  dextrose 50% Injectable 25 Gram(s) IV Push once  glucagon  Injectable 1 milliGRAM(s) IntraMuscular once  insulin lispro (ADMELOG) corrective regimen sliding scale   SubCutaneous every 6 hours    MEDICATIONS  (PRN):      CAPILLARY BLOOD GLUCOSE      POCT Blood Glucose.: 140 mg/dL (2022 06:09)  POCT Blood Glucose.: 149 mg/dL (2022 23:46)  POCT Blood Glucose.: 149 mg/dL (2022 17:50)  POCT Blood Glucose.: 170 mg/dL (2022 12:20)  POCT Blood Glucose.: 173 mg/dL (2022 07:49)    I&O's Summary    2022 07:01  -  2022 07:00  --------------------------------------------------------  IN: 0 mL / OUT: 0 mL / NET: 0 mL        VITALS:   T(C): 36.3 (22 @ 06:00), Max: 36.5 (22 @ 17:57)  HR: 89 (22 @ 06:00) (76 - 99)  BP: 108/64 (22 @ 06:00) (98/58 - 117/70)  RR: 18 (22 @ 06:00) (16 - 18)  SpO2: 100% (22 @ 06:00) (99% - 100%)    GENERAL: NAD, lying in bed comfortably  HEAD:  Atraumatic, normocephalic  EYES: EOMI, PERRLA, conjunctiva and sclera clear  ENT: Moist mucous membranes  NECK: Supple, no JVD  HEART: Regular rate and rhythm, no murmurs, rubs, or gallops  LUNGS: Unlabored respirations.  Clear to auscultation bilaterally, no crackles, wheezing, or rhonchi  ABDOMEN: Soft, nontender, nondistended, +BS  EXTREMITIES: 2+ peripheral pulses bilaterally. No clubbing, cyanosis, or edema  NERVOUS SYSTEM:  A&Ox3, no focal deficits   SKIN: No rashes or lesions    LABS:                        10.6   9.34  )-----------( 87       ( 2022 17:28 )             34.7         154<H>  |  118<H>  |  63<H>  ----------------------------<  172<H>  3.8   |  17<L>  |  3.85<H>    Ca    8.5      2022 17:28  Phos  5.1       Mg     2.00     -    TPro  6.4  /  Alb  2.5<L>  /  TBili  1.3<H>  /  DBili  x   /  AST  17  /  ALT  13  /  AlkPhos  254<H>      PT/INR - ( 2022 20:23 )   PT: 15.1 sec;   INR: 1.34 ratio         PTT - ( 2022 20:23 )  PTT:31.7 sec      Urinalysis Basic - ( 2022 20:56 )    Color: Yellow / Appearance: Clear / S.019 / pH: x  Gluc: x / Ketone: Negative  / Bili: Negative / Urobili: <2 mg/dL   Blood: x / Protein: 30 mg/dL / Nitrite: Negative   Leuk Esterase: Negative / RBC: 2 /HPF / WBC 10-20 /HPF   Sq Epi: x / Non Sq Epi: 1 /HPF / Bacteria: Few        Culture - Blood (collected 2022 22:51)  Source: .Blood Blood-Peripheral  Preliminary Report (2022 23:02):    No growth to date.    Culture - Blood (collected 2022 22:51)  Source: .Blood Blood-Peripheral  Preliminary Report (2022 23:02):    No growth to date.    Culture - Urine (collected 2022 20:32)  Source: Clean Catch Clean Catch (Midstream)  Final Report (2022 19:12):    No growth        RADIOLOGY & ADDITIONAL TESTS:  Results Reviewed:   Imaging Personally Reviewed:  Electrocardiogram Personally Reviewed:    COORDINATION OF CARE:  Care Discussed with Consultants/Other Providers [Y/N]:  Prior or Outpatient Records Reviewed [Y/N]:   Jose Kang MD  Internal Medicine PGY-1  Pager NS: [phone] // [facility]: [phone]    PROGRESS NOTE:     Patient is a 66y old  Male who presents with a chief complaint of poor PO intake/FTT (2022 17:27)      SUBJECTIVE / OVERNIGHT EVENTS:    Spoke to nurse appx. 7PM last night after starting D5W on patient - per nurse patient had not actually received 1/2NS + bicarb fluids yesterday due to issue with obtaining fluids from pharmacy. Informed nurse to continue D5W at this time. No acute events overnight. Patient condition appears improved today, less uncomfortable appearance, more responsive to verbal stimuli compared to yesterday. Pt. still A&Ox0 but is able to mumble when name is called.        REVIEW OF SYSTEMS: unable to be obtained due to pt's mental status.      MEDICATIONS  (STANDING):  caspofungin IVPB 50 milliGRAM(s) IV Intermittent every 24 hours  dextrose 40% Gel 15 Gram(s) Oral once  dextrose 5%. 1000 milliLiter(s) (50 mL/Hr) IV Continuous <Continuous>  dextrose 5%. 1000 milliLiter(s) (75 mL/Hr) IV Continuous <Continuous>  dextrose 5%. 1000 milliLiter(s) (100 mL/Hr) IV Continuous <Continuous>  dextrose 50% Injectable 25 Gram(s) IV Push once  dextrose 50% Injectable 12.5 Gram(s) IV Push once  dextrose 50% Injectable 25 Gram(s) IV Push once  glucagon  Injectable 1 milliGRAM(s) IntraMuscular once  insulin lispro (ADMELOG) corrective regimen sliding scale   SubCutaneous every 6 hours    MEDICATIONS  (PRN):      CAPILLARY BLOOD GLUCOSE      POCT Blood Glucose.: 140 mg/dL (2022 06:09)  POCT Blood Glucose.: 149 mg/dL (2022 23:46)  POCT Blood Glucose.: 149 mg/dL (2022 17:50)  POCT Blood Glucose.: 170 mg/dL (2022 12:20)  POCT Blood Glucose.: 173 mg/dL (2022 07:49)    I&O's Summary    2022 07:01  -  2022 07:00  --------------------------------------------------------  IN: 0 mL / OUT: 0 mL / NET: 0 mL        VITALS:   T(C): 36.3 (22 @ 06:00), Max: 36.5 (22 @ 17:57)  HR: 89 (22 @ 06:00) (76 - 99)  BP: 108/64 (22 @ 06:00) (98/58 - 117/70)  RR: 18 (22 @ 06:00) (16 - 18)  SpO2: 100% (22 @ 06:00) (99% - 100%)    GENERAL: NAD, lying in bed, mildly uncomfortable appearance but improved from yesterday gaunt appearance.  HEAD:  Atraumatic, normocephalic  NECK: Supple, no JVD  HEART: Regular rate and rhythm, no murmurs, rubs, or gallops  LUNGS: Unlabored respirations.  Clear to auscultation bilaterally, no crackles, wheezing, or rhonchi  ABDOMEN: Soft, nontender, nondistended, +BS  EXTREMITIES: 2+ peripheral pulses bilaterally. No clubbing, cyanosis, or edema  NERVOUS SYSTEM:  A&Ox0, motor strength intact in all extremities.  SKIN: No rashes or lesions    LABS:                        10.6   9.34  )-----------( 87       ( 2022 17:28 )             34.7     -04    154<H>  |  118<H>  |  63<H>  ----------------------------<  172<H>  3.8   |  17<L>  |  3.85<H>    Ca    8.5      2022 17:28  Phos  5.1     -  Mg     2.00     -    TPro  6.4  /  Alb  2.5<L>  /  TBili  1.3<H>  /  DBili  x   /  AST  17  /  ALT  13  /  AlkPhos  254<H>  -    PT/INR - ( 2022 20:23 )   PT: 15.1 sec;   INR: 1.34 ratio         PTT - ( 2022 20:23 )  PTT:31.7 sec      Urinalysis Basic - ( 2022 20:56 )    Color: Yellow / Appearance: Clear / S.019 / pH: x  Gluc: x / Ketone: Negative  / Bili: Negative / Urobili: <2 mg/dL   Blood: x / Protein: 30 mg/dL / Nitrite: Negative   Leuk Esterase: Negative / RBC: 2 /HPF / WBC 10-20 /HPF   Sq Epi: x / Non Sq Epi: 1 /HPF / Bacteria: Few        Culture - Blood (collected 2022 22:51)  Source: .Blood Blood-Peripheral  Preliminary Report (2022 23:02):    No growth to date.    Culture - Blood (collected 2022 22:51)  Source: .Blood Blood-Peripheral  Preliminary Report (2022 23:02):    No growth to date.    Culture - Urine (collected 2022 20:32)  Source: Clean Catch Clean Catch (Midstream)  Final Report (2022 19:12):    No growth        RADIOLOGY & ADDITIONAL TESTS:  Results Reviewed:   Imaging Personally Reviewed:  Electrocardiogram Personally Reviewed:    COORDINATION OF CARE:  Care Discussed with Consultants/Other Providers [Y/N]:  Prior or Outpatient Records Reviewed [Y/N]:

## 2022-01-05 NOTE — PROGRESS NOTE ADULT - PROBLEM SELECTOR PLAN 1
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 start caspofungin  trend leukocytosis and lactate (lactate downtrended on repeat)  CT as above, possible colitis, no abdominal TTP noted on exam

## 2022-01-05 NOTE — PROGRESS NOTE ADULT - PROBLEM SELECTOR PLAN 6
likely multifactorial in setting of sepsis/fungemia, electrolyte abnormality/dehydration, cirrhosis   reportedly A&Ox2 at baseline  check ammonia, TSH, Vit B12  CTH pending  fall, aspiration precautions  NPO for now, dysphagia screen when able,   Refused S&S participation  PT consult likely multifactorial in setting of sepsis/fungemia, electrolyte abnormality/dehydration, cirrhosis   reportedly A&Ox2 at baseline  Ammonia elevated, receiving rectal lactulose  B12, TSH normal.  CTH complete, negative   fall, aspiration precautions  NPO for now except medications, dysphagia screen when able,   Refused S&S participation  PT consult

## 2022-01-05 NOTE — PROGRESS NOTE ADULT - PROBLEM SELECTOR PLAN 3
-incentive spirometry when able  -SpO2 % on RA, not meeting criteria for steroids or remdesivir  -inflammatory markers pending  -CXR negative  isolation precautions    #DVT ppx  -holding pharmacologic DVT ppx pending CTH -incentive spirometry when able  -SpO2 % on RA, not meeting criteria for steroids or remdesivir  -inflammatory markers pending  -CXR negative  isolation precautions    DVT PPX: can restart on heparin subq given ANAHI

## 2022-01-05 NOTE — PROGRESS NOTE ADULT - PROBLEM SELECTOR PLAN 2
-pt. with Hx candida parasilosis fungemia  -ID consulted, per recs will order 70mgx1 caspofungin and then 50mg daily.  -f/u BCX  -TTE  -will try and get more collateral regarding fungemia -pt. with Hx candida parasilosis fungemia  -ID consulted, per recs will order 70mgx1 caspofungin and then 50mg daily.   -received sensitivites from [facility] laboratory, sent to ID fellow.  -f/u BCX  -TTE  -will try and get more collateral regarding fungemia

## 2022-01-05 NOTE — PROGRESS NOTE ADULT - PROBLEM SELECTOR PLAN 8
-check ammonia  -refused S&S, will give rectal lactulose  -dose meds for decreased hepatic function  -prev hx paracentesis in past, unclear etiology of cirrhosis. -check ammonia, elevated to 106  -refused S&S, will give rectal lactulose  -dose meds for decreased hepatic function  -prev hx paracentesis in past, unclear etiology of cirrhosis.

## 2022-01-05 NOTE — PROGRESS NOTE ADULT - SUBJECTIVE AND OBJECTIVE BOX
[facility]   MD DEMARCUS NORMAN MD, PA INJUNG KO NP    TEL:  OFFICE: [phone]  DR AGUAYO CELL: [phone]  DR. QUINTERO CELL: [phone]  ESTELLA RUBY CELL: [phone]  MARY FORTE CELL :[phone]    From 5pm-7am Answering Service [phone]    -- RENAL FOLLOW UP NOTE ---Date of Service 01-05-22 @ 14:16    Patient is a 66y old  Male who presents with a chief complaint of poor PO intake/FTT (05 Jan 2022 07:20)    Patient seen and examined at bedside.     VITALS:  T(F): 97.4 (01-05-22 @ 09:00), Max: 97.7 (01-04-22 @ 17:57)  HR: 90 (01-05-22 @ 09:00)  BP: 102/64 (01-05-22 @ 09:00)  RR: 18 (01-05-22 @ 09:00)  SpO2: 100% (01-05-22 @ 09:00)  Wt(kg): --    01-04 @ 07:01  -  01-05 @ 07:00  --------------------------------------------------------  IN: 0 mL / OUT: 0 mL / NET: 0 mL        PHYSICAL EXAM:  Constitutional: NAD  Neck: No JVD  Respiratory: CTAB, no wheezes, rales or rhonchi  Cardiovascular: S1, S2, RRR  Gastrointestinal: BS+, soft, NT/ND  Extremities: No peripheral edema    Hospital Medications:   MEDICATIONS  (STANDING):  caspofungin IVPB 50 milliGRAM(s) IV Intermittent every 24 hours  dextrose 40% Gel 15 Gram(s) Oral once  dextrose 5% 1000 milliLiter(s) (100 mL/Hr) IV Continuous <Continuous>  dextrose 5%. 1000 milliLiter(s) (50 mL/Hr) IV Continuous <Continuous>  dextrose 5%. 1000 milliLiter(s) (75 mL/Hr) IV Continuous <Continuous>  dextrose 5%. 1000 milliLiter(s) (100 mL/Hr) IV Continuous <Continuous>  dextrose 50% Injectable 25 Gram(s) IV Push once  dextrose 50% Injectable 12.5 Gram(s) IV Push once  dextrose 50% Injectable 25 Gram(s) IV Push once  glucagon  Injectable 1 milliGRAM(s) IntraMuscular once  insulin lispro (ADMELOG) corrective regimen sliding scale   SubCutaneous every 6 hours      LABS:  01-05    153<H>  |  117<H>  |  70<H>  ----------------------------<  160<H>  3.9   |  16<L>  |  4.16<H>    Ca    8.6      05 Jan 2022 08:06  Phos  5.2     01-05  Mg     2.10     01-05    TPro  6.7  /  Alb  2.4<L>  /  TBili  1.4<H>  /  DBili      /  AST  17  /  ALT  12  /  AlkPhos  249<H>  01-05    Creatinine Trend: 4.16 <--, 3.85 <--, 3.68 <--, 3.81 <--    Albumin, Serum: 2.4 g/dL (01-05 @ 08:06)  Phosphorus Level, Serum: 5.2 mg/dL (01-05 @ 08:06)  Albumin, Serum: 2.5 g/dL (01-04 @ 17:28)                              11.2   7.85  )-----------( 101      ( 05 Jan 2022 08:06 )             35.3     Urine Studies:  Urinalysis - [01-03-22 @ 20:56]      Color Yellow / Appearance Clear / SG 1.019 / pH 6.0      Gluc Negative / Ketone Negative  / Bili Negative / Urobili <2 mg/dL       Blood Trace / Protein 30 mg/dL / Leuk Est Negative / Nitrite Negative      RBC 2 / WBC 10-20 / Hyaline 16 / Gran  / Sq Epi  / Non Sq Epi 1 / Bacteria Few      Ferritin 1846      [01-04-22 @ 08:10]  TSH 1.33      [01-04-22 @ 08:10]        RADIOLOGY & ADDITIONAL STUDIES:

## 2022-01-05 NOTE — PROGRESS NOTE ADULT - ASSESSMENT
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 Patient noted to be hypotensive as well w/VS prior to transfer: 97.6  108  88/47  17  97%RA. Patient also noted to have decreased level of consciousness, generalized weakness. Nephrology consulted for ANAHI     A/P    ANAHI   etiology?   likely pre-renal, but need to r/o ATN    patient has a history of ATN; was on HD before   ultrasound shows no hydronephrosis   urine cr, urine na pending ,   check bladder scan to r/o urinary retention   clinically dehydrated  s/p ; 1/2n/s + 75meq bicarb 75cc/hr x24hr 01/04/22  continue to hydrate with D/5 +75meq bicarb 100/hr x24hrs   spoke with his daughter Hunter Nathan for possible RRT   consent is in his chart   monitor BMP, U/O, volume status   Avoid further nephrotoxic medications, NSAIDS RCA     Lactic acidosis   etiology? likely 2/2 poor perfusion   not improving   continue to hydrate with D/5 +75meq bicarb 100/hr x24hrs    Hypernatremia   likely 2/2 dehydration   continue to hydrate with D/5 +75meq bicarb 100/hr x24hrs today   urine na, urine osmol pending

## 2022-01-05 NOTE — PROGRESS NOTE ADULT - PROBLEM SELECTOR PLAN 5
likely in setting of poor PO intake/dehydration  s/p IVF in ED, repeat labs pending for 4PM  - per nephro placed on 1/2NS with 75 bicarb @ 75cc/hr, however there was issue with administration of fluids and were not started  - pt. on D5W overnight likely in setting of poor PO intake/dehydration  s/p IVF in ED, repeat labs pending for 4PM  - per nephro placed on 1/2NS with 75 bicarb @ 75cc/hr, however there was issue with administration of fluids and were not started  - pt. on D5W overnight, placed back on 1/2NS w/ 75 bicarb per nephro. likely in setting of poor PO intake/dehydration  s/p IVF in ED, repeat labs pending for 4PM  - per nephro placed on 1/2NS with 75 bicarb @ 75cc/hr, however there was issue with administration of fluids and were not started  - pt. on D5W overnight, placed on D5 1/2NS w/ 75 bicarb today per nephro. likely in setting of poor PO intake/dehydration  s/p IVF in ED, repeat labs pending for 4PM  - per nephro placed on 1/2NS with 75 bicarb @ 75cc/hr, however there was issue with administration of fluids and were not started  - pt. on D5W overnight, placed on D5 w/ 75 bicarb today per nephro.

## 2022-01-05 NOTE — PROGRESS NOTE ADULT - PROBLEM SELECTOR PLAN 7
- Baseline Cr .82 as of 8/2021, per daughter pt. received Abx treatment for OM that gave patient ATN with Cr 5-6 requiring HD in November/December, pt off HD for appx. 2 weeks.  -Cr elevated to 3.81  monitor/trend renal function  renally dose meds, avoid nephrotoxins  monitor I/Os - Baseline Cr .82 as of 8/2021, per daughter pt. received Abx treatment for OM that gave patient ATN with Cr 5-6 requiring HD in November/December, pt off HD for appx. 2 weeks.  -Cr elevated to 3.81  monitor/trend renal function  renally dose meds, avoid nephrotoxins  monitor I/Os  -nephro following.

## 2022-01-05 NOTE — PROGRESS NOTE ADULT - SUBJECTIVE AND OBJECTIVE BOX
Neurology Progress Note    S: Patient seen and examined in covid unit. No new events overnight     Medication:  caspofungin IVPB 50 milliGRAM(s) IV Intermittent every 24 hours  dextrose 40% Gel 15 Gram(s) Oral once  dextrose 5% 1000 milliLiter(s) IV Continuous <Continuous>  dextrose 5%. 1000 milliLiter(s) IV Continuous <Continuous>  dextrose 5%. 1000 milliLiter(s) IV Continuous <Continuous>  dextrose 5%. 1000 milliLiter(s) IV Continuous <Continuous>  dextrose 50% Injectable 25 Gram(s) IV Push once  dextrose 50% Injectable 12.5 Gram(s) IV Push once  dextrose 50% Injectable 25 Gram(s) IV Push once  glucagon  Injectable 1 milliGRAM(s) IntraMuscular once  heparin   Injectable 5000 Unit(s) SubCutaneous every 12 hours  insulin lispro (ADMELOG) corrective regimen sliding scale   SubCutaneous every 6 hours      Vitals:  Vital Signs Last 24 Hrs  T(C): 36.3 (2022 09:00), Max: 36.5 (2022 17:57)  T(F): 97.4 (2022 09:00), Max: 97.7 (2022 17:57)  HR: 90 (2022 09:00) (76 - 97)  BP: 102/64 (2022 09:00) (98/58 - 111/63)  BP(mean): --  RR: 18 (2022 09:00) (16 - 18)  SpO2: 100% (2022 09:00) (99% - 100%)    General Exam:   General Appearance: Appropriately dressed and in no acute distress       Head: Normocephalic, atraumatic and no dysmorphic features  Ear, Nose, and Throat: Moist mucous membranes  CVS: S1S2+  Resp: No SOB, no wheeze or rhonchi  GI: soft NT/ND  Extremities: No edema or cyanosis  Skin: No bruises or rashes     Neurological Exam:  Mental Status: Awake, alert and oriented x 0-1.  no verbal. not following but tries to mimics.   Cranial Nerves: PERRL, EOMI, VFFC, sensation V1-V3 intact,  R? facial asymmetry, equal elevation of palate, scm/trap 5/5, tongue is midline on protrusion.   hearing is grossly intact.   Motor: generlzied weakness. moving L>R and uppers > lowerse   Sensation: withdraws x4  Reflexes: 1+ throughout at biceps, brachioradialis, triceps, patellars and ankles bilaterally and equal. No clonus.   Coordination: unable   Gait: unable     I personally reviewed the below data/images/labs:      CBC Full  -  ( 2022 08:06 )  WBC Count : 7.85 K/uL  RBC Count : 3.75 M/uL  Hemoglobin : 11.2 g/dL  Hematocrit : 35.3 %  Platelet Count - Automated : 101 K/uL  Mean Cell Volume : 94.1 fL  Mean Cell Hemoglobin : 29.9 pg  Mean Cell Hemoglobin Concentration : 31.7 gm/dL  Auto Neutrophil # : x  Auto Lymphocyte # : x  Auto Monocyte # : x  Auto Eosinophil # : x  Auto Basophil # : x  Auto Neutrophil % : x  Auto Lymphocyte % : x  Auto Monocyte % : x  Auto Eosinophil % : x  Auto Basophil % : x    -    153<H>  |  117<H>  |  70<H>  ----------------------------<  160<H>  3.9   |  16<L>  |  4.16<H>    Ca    8.6      2022 08:06  Phos  5.2     -05  Mg     2.10     -    TPro  6.7  /  Alb  2.4<L>  /  TBili  1.4<H>  /  DBili  x   /  AST  17  /  ALT  12  /  AlkPhos  249<H>  01-05    LIVER FUNCTIONS - ( 2022 08:06 )  Alb: 2.4 g/dL / Pro: 6.7 g/dL / ALK PHOS: 249 U/L / ALT: 12 U/L / AST: 17 U/L / GGT: x           PT/INR - ( 2022 08:06 )   PT: 16.0 sec;   INR: 1.43 ratio         PTT - ( 2022 20:23 )  PTT:31.7 sec  Urinalysis Basic - ( 2022 20:56 )    Color: Yellow / Appearance: Clear / S.019 / pH: x  Gluc: x / Ketone: Negative  / Bili: Negative / Urobili: <2 mg/dL   Blood: x / Protein: 30 mg/dL / Nitrite: Negative   Leuk Esterase: Negative / RBC: 2 /HPF / WBC 10-20 /HPF   Sq Epi: x / Non Sq Epi: 1 /HPF / Bacteria: Few    < from: CT Head No Cont (22 @ 10:18) >    ACC: [id number] EXAM:  CT BRAIN                          PROCEDURE DATE:  2022          INTERPRETATION:  CLINICAL INDICATION: Altered mental status, cirrhosis,   portal hypertension    5mm axial sections of the brain were obtained from base to vertex,   without the intravenous administration of contrast material. Coronal and   sagittal computer generated reconstructed views are available.    No prior brain imaging is available for comparison.        The fourth, third and lateral ventricles are normal size and position.   There is no hemorrhage, mass or shift of the midline structures. There is   normal gray white matter differentiation. Bone window examination is   unremarkable.    IMPRESSION: Unremarkable noncontrast CT of the brain.    --- End of Report ---            ELZBIETA GEE MD; Attending Radiologist  This document has been electronically signed. 2022 10:25AM    < end of copied text >

## 2022-01-06 NOTE — PROGRESS NOTE ADULT - SUBJECTIVE AND OBJECTIVE BOX
[facility]   MD DEMARCUS NORMAN MD, PA INJUNG KO NP    TEL:  OFFICE: [phone]  DR AGUAYO CELL: [phone]  DR. QUINTERO CELL: [phone]  ESTELLA RUBY CELL: [phone]  MARY FORTE CELL :[phone]    From 5pm-7am Answering Service [phone]    -- RENAL FOLLOW UP NOTE ---Date of Service 01-06-22 @ 12:51    Patient is a 66y old  Male who presents with a chief complaint of poor PO intake/FTT (06 Jan 2022 09:09)    Patient seen and examined at bedside. Not in apparent distress     VITALS:  T(F): 97.7 (01-06-22 @ 10:30), Max: 98.1 (01-06-22 @ 05:24)  HR: 88 (01-06-22 @ 10:30)  BP: 118/66 (01-06-22 @ 10:30)  RR: 17 (01-06-22 @ 10:30)  SpO2: 99% (01-06-22 @ 10:30)  Wt(kg): --    01-05 @ 07:01  -  01-06 @ 07:00  --------------------------------------------------------  IN: 0 mL / OUT: 260 mL / NET: -260 mL      PHYSICAL EXAM:  Constitutional: NAD  Neck: No JVD  Respiratory: CTAB, no wheezes, rales or rhonchi  Cardiovascular: S1, S2, RRR  Gastrointestinal: BS+, soft, NT/ND  Extremities: No peripheral edema    Hospital Medications:   MEDICATIONS  (STANDING):  dextrose 40% Gel 15 Gram(s) Oral once  dextrose 5% 1000 milliLiter(s) (100 mL/Hr) IV Continuous <Continuous>  dextrose 5%. 1000 milliLiter(s) (50 mL/Hr) IV Continuous <Continuous>  dextrose 5%. 1000 milliLiter(s) (75 mL/Hr) IV Continuous <Continuous>  dextrose 5%. 1000 milliLiter(s) (100 mL/Hr) IV Continuous <Continuous>  dextrose 50% Injectable 25 Gram(s) IV Push once  dextrose 50% Injectable 12.5 Gram(s) IV Push once  dextrose 50% Injectable 25 Gram(s) IV Push once  fluconAZOLE IVPB 200 milliGRAM(s) IV Intermittent every 24 hours  glucagon  Injectable 1 milliGRAM(s) IntraMuscular once  heparin   Injectable 5000 Unit(s) SubCutaneous every 12 hours  insulin lispro (ADMELOG) corrective regimen sliding scale   SubCutaneous every 6 hours  lactulose Retention Enema 200 Gram(s) Rectal once      LABS:  01-06    155<H>  |  120<H>  |  78<H>  ----------------------------<  181<H>  3.2<L>   |  16<L>  |  4.45<H>    Ca    8.2<L>      06 Jan 2022 07:48  Phos  5.2     01-06  Mg     2.20     01-06    TPro  6.1  /  Alb  2.3<L>  /  TBili  1.3<H>  /  DBili      /  AST  13  /  ALT  12  /  AlkPhos  242<H>  01-06    Creatinine Trend: 4.45 <--, 4.16 <--, 3.85 <--, 3.68 <--, 3.81 <--    Albumin, Serum: 2.3 g/dL (01-06 @ 07:48)  Phosphorus Level, Serum: 5.2 mg/dL (01-06 @ 07:48)                              10.2   5.19  )-----------( 83       ( 06 Jan 2022 07:48 )             33.1     Urine Studies:  Urinalysis - [01-03-22 @ 20:56]      Color Yellow / Appearance Clear / SG 1.019 / pH 6.0      Gluc Negative / Ketone Negative  / Bili Negative / Urobili <2 mg/dL       Blood Trace / Protein 30 mg/dL / Leuk Est Negative / Nitrite Negative      RBC 2 / WBC 10-20 / Hyaline 16 / Gran  / Sq Epi  / Non Sq Epi 1 / Bacteria Few    Urine Osmolality 391      [01-05-22 @ 17:38]    Ferritin 1846      [01-04-22 @ 08:10]  TSH 1.33      [01-04-22 @ 08:10]    HCV 0.53, Nonreact      [01-05-22 @ 10:37]      RADIOLOGY & ADDITIONAL STUDIES:

## 2022-01-06 NOTE — PROGRESS NOTE ADULT - SUBJECTIVE AND OBJECTIVE BOX
Jasper Pardo MD    Internal Medicine Resident (PGY-3)  Pager: [phone] (NS) / [phone] (KRANTHI)  Please see "resident assignment" column on [facility] for coverage info  ___________________________________________________________________________________________________      BHARATH MAYORGA 66y Male    Overnight events/subjective:     Vital Signs Last 24 Hrs  T(C): 36.7 (06 Jan 2022 05:24), Max: 36.7 (06 Jan 2022 05:24)  T(F): 98.1 (06 Jan 2022 05:24), Max: 98.1 (06 Jan 2022 05:24)  HR: 90 (06 Jan 2022 05:24) (86 - 90)  BP: 129/67 (06 Jan 2022 05:24) (102/64 - 140/95)  BP(mean): --  RR: 16 (06 Jan 2022 05:24) (16 - 18)  SpO2: 100% (06 Jan 2022 05:24) (99% - 100%)    PHYSICAL EXAM:      HOSPITAL MEDICATIONS:  MEDICATIONS  (STANDING):  dextrose 40% Gel 15 Gram(s) Oral once  dextrose 5% 1000 milliLiter(s) (100 mL/Hr) IV Continuous <Continuous>  dextrose 5%. 1000 milliLiter(s) (50 mL/Hr) IV Continuous <Continuous>  dextrose 5%. 1000 milliLiter(s) (75 mL/Hr) IV Continuous <Continuous>  dextrose 5%. 1000 milliLiter(s) (100 mL/Hr) IV Continuous <Continuous>  dextrose 50% Injectable 25 Gram(s) IV Push once  dextrose 50% Injectable 12.5 Gram(s) IV Push once  dextrose 50% Injectable 25 Gram(s) IV Push once  fluconAZOLE IVPB 200 milliGRAM(s) IV Intermittent every 24 hours  glucagon  Injectable 1 milliGRAM(s) IntraMuscular once  heparin   Injectable 5000 Unit(s) SubCutaneous every 12 hours  insulin lispro (ADMELOG) corrective regimen sliding scale   SubCutaneous every 6 hours    MEDICATIONS  (PRN):      LABS:                        11.2   7.85  )-----------( 101      ( 05 Jan 2022 08:06 )             35.3     01-05    153<H>  |  117<H>  |  70<H>  ----------------------------<  160<H>  3.9   |  16<L>  |  4.16<H>    Ca    8.6      05 Jan 2022 08:06  Phos  5.2     01-05  Mg     2.10     01-05    TPro  6.7  /  Alb  2.4<L>  /  TBili  1.4<H>  /  DBili  x   /  AST  17  /  ALT  12  /  AlkPhos  249<H>  01-05    PT/INR - ( 05 Jan 2022 08:06 )   PT: 16.0 sec;   INR: 1.43 ratio                Jasper Pardo MD    Internal Medicine Resident (PGY-3)  Pager: [phone] (NS) / [phone] (KRANTHI)  Please see "resident assignment" column on [facility] for coverage info  ___________________________________________________________________________________________________      TIERRA ORON 66y Male    Overnight events/subjective: Retaining urine overnight, s/p straight cath, will likely require Betancur. Patient seen and examined at bedside. AAOx0, less agitated than yesterday.    Vital Signs Last 24 Hrs  T(C): 36.7 (06 Jan 2022 05:24), Max: 36.7 (06 Jan 2022 05:24)  T(F): 98.1 (06 Jan 2022 05:24), Max: 98.1 (06 Jan 2022 05:24)  HR: 90 (06 Jan 2022 05:24) (86 - 90)  BP: 129/67 (06 Jan 2022 05:24) (102/64 - 140/95)  BP(mean): --  RR: 16 (06 Jan 2022 05:24) (16 - 18)  SpO2: 100% (06 Jan 2022 05:24) (99% - 100%)    PHYSICAL EXAM:  GENERAL: NAD, lying in bed, mildly uncomfortable appearance but improved from yesterday gaunt appearance.  HEAD:  Atraumatic, normocephalic  NECK: Supple, no JVD  HEART: Regular rate and rhythm, no murmurs, rubs, or gallops  LUNGS: Unlabored respirations.  Clear to auscultation bilaterally, no crackles, wheezing, or rhonchi  ABDOMEN: Soft, nontender, nondistended, +BS  EXTREMITIES: 2+ peripheral pulses bilaterally. No clubbing, cyanosis, or edema  NERVOUS SYSTEM:  A&Ox0, motor strength intact in all extremities.  SKIN: No rashes or lesions      HOSPITAL MEDICATIONS:  MEDICATIONS  (STANDING):  dextrose 40% Gel 15 Gram(s) Oral once  dextrose 5% 1000 milliLiter(s) (100 mL/Hr) IV Continuous <Continuous>  dextrose 5%. 1000 milliLiter(s) (50 mL/Hr) IV Continuous <Continuous>  dextrose 5%. 1000 milliLiter(s) (75 mL/Hr) IV Continuous <Continuous>  dextrose 5%. 1000 milliLiter(s) (100 mL/Hr) IV Continuous <Continuous>  dextrose 50% Injectable 25 Gram(s) IV Push once  dextrose 50% Injectable 12.5 Gram(s) IV Push once  dextrose 50% Injectable 25 Gram(s) IV Push once  fluconAZOLE IVPB 200 milliGRAM(s) IV Intermittent every 24 hours  glucagon  Injectable 1 milliGRAM(s) IntraMuscular once  heparin   Injectable 5000 Unit(s) SubCutaneous every 12 hours  insulin lispro (ADMELOG) corrective regimen sliding scale   SubCutaneous every 6 hours    MEDICATIONS  (PRN):      LABS:                        11.2   7.85  )-----------( 101      ( 05 Jan 2022 08:06 )             35.3     01-05    153<H>  |  117<H>  |  70<H>  ----------------------------<  160<H>  3.9   |  16<L>  |  4.16<H>    Ca    8.6      05 Jan 2022 08:06  Phos  5.2     01-05  Mg     2.10     01-05    TPro  6.7  /  Alb  2.4<L>  /  TBili  1.4<H>  /  DBili  x   /  AST  17  /  ALT  12  /  AlkPhos  249<H>  01-05    PT/INR - ( 05 Jan 2022 08:06 )   PT: 16.0 sec;   INR: 1.43 ratio

## 2022-01-06 NOTE — PROGRESS NOTE ADULT - SUBJECTIVE AND OBJECTIVE BOX
CC: F/U for Fungemia    Saw/spoke to patient. No fevers, no chills. No new complaints. Unchanged.    Allergies  daptomycin (Unknown)  ertapenem (Unknown)    ANTIMICROBIALS:  fluconAZOLE IVPB 200 every 24 hours    PE:    Vital Signs Last 24 Hrs  T(C): 36.5 (2022 10:30), Max: 36.7 (2022 05:24)  T(F): 97.7 (2022 10:30), Max: 98.1 (2022 05:24)  HR: 88 (2022 10:30) (86 - 90)  BP: 118/66 (2022 10:30) (112/71 - 129/67)  RR: 17 (2022 10:30) (16 - 17)  SpO2: 99% (2022 10:30) (99% - 100%)    Gen: AOx3, NAD, non-toxic  CV: S1+S2 normal, nontachycardic  Resp: Clear bilat, no resp distress, no crackles/wheezes  Abd: Soft, nontender, +BS  Ext: No LE edema, no wounds    LABS:                        10.2   5.19  )-----------( 83       ( 2022 07:48 )             33.1     01-06    155<H>  |  120<H>  |  78<H>  ----------------------------<  181<H>  3.2<L>   |  16<L>  |  4.45<H>    Ca    8.2<L>      2022 07:48  Phos  5.2     -  Mg     2.20     -    TPro  6.1  /  Alb  2.3<L>  /  TBili  1.3<H>  /  DBili  x   /  AST  13  /  ALT  12  /  AlkPhos  242<H>      Urinalysis Basic - ( 2022 14:02 )    Color: Yellow / Appearance: Clear / S.019 / pH: x  Gluc: x / Ketone: Negative  / Bili: Negative / Urobili: <2 mg/dL   Blood: x / Protein: 30 mg/dL / Nitrite: Negative   Leuk Esterase: Negative / RBC: 11 /HPF / WBC 8 /HPF   Sq Epi: x / Non Sq Epi: 2 /HPF / Bacteria: Negative    MICROBIOLOGY:    .Blood Blood-Peripheral  22   No growth to date.      Clean Catch Clean Catch (Midstream)  22   No growth      Rapid RVP Result: Detected ( @ 21:14) COVID    (otherwise reviewed)    RADIOLOGY:          5mm axial sections of the brain were obtained from base to vertex,   without the intravenous administration of contrast material. Coronal and   sagittal computer generated reconstructed views are available.    No prior brain imaging is available for comparison.        The fourth, third and lateral ventricles are normal size and position.   There is no hemorrhage, mass or shift of the midline structures. There is   normal gray white matter differentiation. Bone window examination is   unremarkable.    IMPRESSION: Unremarkable noncontrast CT of the brain.

## 2022-01-06 NOTE — PROGRESS NOTE ADULT - PROBLEM SELECTOR PLAN 4
not presently on abx, f/u ID recs  no bony spinal TTP on exam -not presently on abx, f/u ID recs  -no bony spinal TTP on exam

## 2022-01-06 NOTE — PROGRESS NOTE ADULT - PROBLEM SELECTOR PLAN 10
reportedly chronic, unknown baseline  monitor/trend  likely AOCD -reportedly chronic, unknown baseline  -monitor/trend  -likely AOCD

## 2022-01-06 NOTE — PROGRESS NOTE ADULT - ASSESSMENT
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), T2DM, cirrhosis (w/ gastric varices), and anemia/thrombocytopenia, CKD, lumbar OM, presenting from NH due to poor PO intake and removal of midline  Leukocytosis, no fever  COVID+  Outside diagnosis of Candidemia on treatment with Ampho B through 1/17  Now elevated Cr  Culture from OSH shows Candida S to Flu and Caspo  Overall,  1) Candidemia  - Appears S to caspo/Flu  - Fluconazole 200mg q 24 (give every day but after HD on dialysis days)  - F/U pending BCXs  - Check TTE, if not able to tolerate can hold off  2) COVID  - RA  - Hold on RemD/Dexa unless progress O2 requirements  - Supportive care  3) Leukocytosis  - Trend to normal  - Monitor for alternate signs infection    Jose Fox MD  Pager [phone]  From 5pm-9am, and on weekends call [phone]

## 2022-01-06 NOTE — PROGRESS NOTE ADULT - PROBLEM SELECTOR PLAN 5
likely in setting of poor PO intake/dehydration  s/p IVF in ED, repeat labs pending for 4PM  - per nephro placed on 1/2NS with 75 bicarb @ 75cc/hr, however there was issue with administration of fluids and were not started  - pt. on D5W overnight, placed on D5 w/ 75 bicarb today per nephro. -likely in setting of poor PO intake/dehydration  - per nephro placed on 1/2NS with 75 bicarb @ 75cc/hr  - c/w IVF as per nephro

## 2022-01-06 NOTE — PROGRESS NOTE ADULT - PROBLEM SELECTOR PLAN 2
-pt. with Hx candida parasilosis fungemia  -ID consulted, per recs will order 70mgx1 caspofungin and then 50mg daily.   -received sensitivites from [facility] laboratory, sent to ID fellow.  -f/u BCX  -TTE  -will try and get more collateral regarding fungemia -pt. with Hx candida parasilosis fungemia  -ID consulted, per recs will order 70mgx1 caspofungin and then 50mg daily.   -received sensitivites from [facility] laboratory, sent to ID fellow.  -BCX NGTD, f/u fungal cx  -TTE  -will try and get more collateral regarding fungemia

## 2022-01-06 NOTE — PROGRESS NOTE ADULT - PROBLEM SELECTOR PLAN 12
transferring to tele for now  monitor QT  avoid QT prolonging agents -transferring to tele for now  -monitor QT  -avoid QT prolonging agents

## 2022-01-06 NOTE — PROGRESS NOTE ADULT - PROBLEM SELECTOR PLAN 7
- Baseline Cr .82 as of 8/2021, per daughter pt. received Abx treatment for OM that gave patient ATN with Cr 5-6 requiring HD in November/December, pt off HD for appx. 2 weeks.  -Cr elevated to 3.81  monitor/trend renal function  renally dose meds, avoid nephrotoxins  monitor I/Os  -nephro following. - Baseline Cr .82 as of 8/2021, per daughter pt. received Abx treatment for OM that gave patient ATN with Cr 5-6 requiring HD in November/December, pt off HD for appx. 2 weeks.  -Cr elevated to 3.81  -monitor/trend renal function  -renally dose meds, avoid nephrotoxins  -monitor I/Os  -nephro following, may need HD

## 2022-01-06 NOTE — PROGRESS NOTE ADULT - PROBLEM SELECTOR PLAN 8
-check ammonia, elevated to 106  -refused S&S, will give rectal lactulose  -dose meds for decreased hepatic function  -prev hx paracentesis in past, unclear etiology of cirrhosis.

## 2022-01-06 NOTE — PROGRESS NOTE ADULT - ASSESSMENT
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 AAO2 baseline.  o/e today R weakness, no verbal ?aphasic. gen weakness confused   B12 and TSH WNL   NH3 106  +thrombocytopenia  abnormal coags.   alk phos elevated   CRP elevated 145  +COVID  Ct abd with known L4/5 OM and lung disease   CTH unremarkable 1/5   A1c 4.9  Impression: AMS likely multifactorial from toxic metabolic encephalopathy from infection on top of hepatic encephalopathy.     - c/w caspofungin for OM  - treatment of infection/covid per primary team  - monitor inflammatory markers and resp status   - lactulose  - check NH3   - if no improvement MRI brain and EEG  - consider ASA if CTH shows infarct. and statin therapy  -  lipid panel  - TTE, tried unable patient not cooperative   - telemetry  - PT/OT/SS/SLP, OOBC  - check FS, glucose control <180  - GI/DVT ppx  - Differential diagnosis and plan of care discussed with patient and/or family and primary team  - Thank you for allowing me to participate in the care of this patient. Call with questions.   Jr Garibay MD  Vascular Neurology  Office: [phone]

## 2022-01-06 NOTE — PROGRESS NOTE ADULT - SUBJECTIVE AND OBJECTIVE BOX
Neurology Progress Note    S: Patient seen and examined in covid unit. No new events overnight     Medication:    MEDICATIONS  (STANDING):  dextrose 40% Gel 15 Gram(s) Oral once  dextrose 5% 1000 milliLiter(s) (100 mL/Hr) IV Continuous <Continuous>  dextrose 5%. 1000 milliLiter(s) (50 mL/Hr) IV Continuous <Continuous>  dextrose 5%. 1000 milliLiter(s) (75 mL/Hr) IV Continuous <Continuous>  dextrose 5%. 1000 milliLiter(s) (100 mL/Hr) IV Continuous <Continuous>  dextrose 50% Injectable 25 Gram(s) IV Push once  dextrose 50% Injectable 12.5 Gram(s) IV Push once  dextrose 50% Injectable 25 Gram(s) IV Push once  fluconAZOLE IVPB 200 milliGRAM(s) IV Intermittent every 24 hours  glucagon  Injectable 1 milliGRAM(s) IntraMuscular once  heparin   Injectable 5000 Unit(s) SubCutaneous every 12 hours  insulin lispro (ADMELOG) corrective regimen sliding scale   SubCutaneous every 6 hours    MEDICATIONS  (PRN):    Vitals:  ICU Vital Signs Last 24 Hrs  T(C): 36.7 (2022 05:24), Max: 36.7 (2022 05:24)  T(F): 98.1 (2022 05:24), Max: 98.1 (2022 05:24)  HR: 90 (2022 05:24) (86 - 90)  BP: 129/67 (2022 05:24) (112/71 - 140/95)  BP(mean): --  ABP: --  ABP(mean): --  RR: 16 (2022 05:24) (16 - 18)  SpO2: 100% (2022 05:24) (99% - 100%)      General Exam:   General Appearance: Appropriately dressed and in no acute distress       Head: Normocephalic, atraumatic and no dysmorphic features  Ear, Nose, and Throat: Moist mucous membranes  CVS: S1S2+  Resp: No SOB, no wheeze or rhonchi  GI: soft NT/ND  Extremities: No edema or cyanosis  Skin: No bruises or rashes     Neurological Exam:  Mental Status: Awake, alert and oriented x 0-1.  no verbal. not following but tries to mimics.   Cranial Nerves: PERRL, EOMI, VFFC, sensation V1-V3 intact,  R? facial asymmetry, equal elevation of palate, scm/trap 5/5, tongue is midline on protrusion.   hearing is grossly intact.   Motor: generlzied weakness. moving L>R and uppers > lowerse   Sensation: withdraws x4  Reflexes: 1+ throughout at biceps, brachioradialis, triceps, patellars and ankles bilaterally and equal. No clonus.   Coordination: unable   Gait: unable     I personally reviewed the below data/images/labs:    CBC Full  -  ( 2022 07:48 )  WBC Count : 5.19 K/uL  RBC Count : 3.55 M/uL  Hemoglobin : 10.2 g/dL  Hematocrit : 33.1 %  Platelet Count - Automated : 83 K/uL  Mean Cell Volume : 93.2 fL  Mean Cell Hemoglobin : 28.7 pg  Mean Cell Hemoglobin Concentration : 30.8 gm/dL  Auto Neutrophil # : x  Auto Lymphocyte # : x  Auto Monocyte # : x  Auto Eosinophil # : x  Auto Basophil # : x  Auto Neutrophil % : x  Auto Lymphocyte % : x  Auto Monocyte % : x  Auto Eosinophil % : x  Auto Basophil % : x      -    155<H>  |  120<H>  |  78<H>  ----------------------------<  181<H>  3.2<L>   |  16<L>  |  4.45<H>    Ca    8.2<L>      2022 07:48  Phos  5.2     -  Mg     2.20     -    TPro  6.1  /  Alb  2.3<L>  /  TBili  1.3<H>  /  DBili  x   /  AST  13  /  ALT  12  /  AlkPhos  242<H>  -      LIVER FUNCTIONS - ( 2022 08:06 )  Alb: 2.4 g/dL / Pro: 6.7 g/dL / ALK PHOS: 249 U/L / ALT: 12 U/L / AST: 17 U/L / GGT: x           PT/INR - ( 2022 08:06 )   PT: 16.0 sec;   INR: 1.43 ratio         PTT - ( 2022 20:23 )  PTT:31.7 sec  Urinalysis Basic - ( 2022 20:56 )    Color: Yellow / Appearance: Clear / S.019 / pH: x  Gluc: x / Ketone: Negative  / Bili: Negative / Urobili: <2 mg/dL   Blood: x / Protein: 30 mg/dL / Nitrite: Negative   Leuk Esterase: Negative / RBC: 2 /HPF / WBC 10-20 /HPF   Sq Epi: x / Non Sq Epi: 1 /HPF / Bacteria: Few    < from: CT Head No Cont (22 @ 10:18) >    ACC: [id number] EXAM:  CT BRAIN                          PROCEDURE DATE:  2022          INTERPRETATION:  CLINICAL INDICATION: Altered mental status, cirrhosis,   portal hypertension    5mm axial sections of the brain were obtained from base to vertex,   without the intravenous administration of contrast material. Coronal and   sagittal computer generated reconstructed views are available.    No prior brain imaging is available for comparison.        The fourth, third and lateral ventricles are normal size and position.   There is no hemorrhage, mass or shift of the midline structures. There is   normal gray white matter differentiation. Bone window examination is   unremarkable.    IMPRESSION: Unremarkable noncontrast CT of the brain.    --- End of Report ---            ELZBIETA GEE MD; Attending Radiologist  This document has been electronically signed. 2022 10:25AM    < end of copied text >

## 2022-01-06 NOTE — PROGRESS NOTE ADULT - PROBLEM SELECTOR PLAN 3
-incentive spirometry when able  -SpO2 % on RA, not meeting criteria for steroids or remdesivir  -inflammatory markers pending  -CXR negative  isolation precautions    DVT PPX: can restart on heparin subq given ANAHI -incentive spirometry when able  -SpO2 % on RA, not meeting criteria for steroids or remdesivir  -inflammatory markers pending  -CXR negative  -isolation precautions  -DVT PPX: can restart on heparin subq given ANAHI

## 2022-01-06 NOTE — PROGRESS NOTE ADULT - PROBLEM SELECTOR PLAN 6
likely multifactorial in setting of sepsis/fungemia, electrolyte abnormality/dehydration, cirrhosis   reportedly A&Ox2 at baseline  Ammonia elevated, receiving rectal lactulose  B12, TSH normal.  CTH complete, negative   fall, aspiration precautions  NPO for now except medications, dysphagia screen when able,   Refused S&S participation  PT consult -likely multifactorial in setting of sepsis/fungemia, electrolyte abnormality/dehydration, cirrhosis   -reportedly A&Ox2 at baseline  -Ammonia elevated, receiving rectal lactulose. f/u ammonia tomorrow  -B12, TSH normal.  -CTH complete, negative   -fall, aspiration precautions  -NPO for now except medications, dysphagia screen when able,   -Refused S&S participation  -PT consult

## 2022-01-06 NOTE — PROGRESS NOTE ADULT - PROBLEM SELECTOR PLAN 1
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

## 2022-01-06 NOTE — CHART NOTE - NSCHARTNOTEFT_GEN_A_CORE
5PM BMP noted w/ worsening hypernatremia and ANAHI. Spoke to Dr. Blount, will start 1/4 NS at 100cc/hr tonight, reevaluate labs tmrw morning. If Na not improving overnight patient will likely need NGT placement. Will also give IV potassium for K 2.9. Discussed w/ attending.    Jasper Pardo MD, PGY-3 Resident  Department of Internal Medicine  Pager: [phone] (NS) / [phone] (KRANTHI)  Email: [email or web address]

## 2022-01-06 NOTE — PROGRESS NOTE ADULT - ASSESSMENT
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 Patient noted to be hypotensive as well w/VS prior to transfer: 97.6  108  88/47  17  97%RA. Patient also noted to have decreased level of consciousness, generalized weakness. Nephrology consulted for ANAHI     A/P    ANAHI   worsening   etiology?   likely pre-renal, but need to r/o ATN    patient has a history of ATN; was on HD before   ultrasound shows no hydronephrosis   please get urine cr, urine na  check bladder scan to r/o urinary retention   clinically dehydrated  s/p ; 1/2n/s + 75meq bicarb 75cc/hr x24hr 01/04/22  Fluid was not given yesterday   continue to hydrate with D/5 +75meq bicarb 100/hr x24hrs   spoke with his daughter Hunter Nathan for possible RRT   consent is in his chart   monitor BMP, U/O, volume status   Avoid further nephrotoxic medications, NSAIDS RCA     Lactic acidosis   etiology? likely 2/2 poor perfusion   not improving   continue to hydrate with D/5 +75meq bicarb 100/hr x24hrs    Hypernatremia   likely 2/2 dehydration   continue to hydrate with D/5 +75meq bicarb 100/hr x24hrs today   urine na, urine osmol pending  66-year-old male with candida fungemia on amphotericin through 1/17/22, IDDM2, cirrhosis w/gastric varices and anemia/thrombocytopenia, CKD, lumbar OM, presenting from [facility] due to poor PO intake, refusal of PO medications and removal of midline 1 day after insertion with refusal of reinsertion, and ID eval. Patient noted to be hypotensive as well w/VS prior to transfer: 97.6  108  88/47  17  97%RA. Patient also noted to have decreased level of consciousness, generalized weakness. Nephrology consulted for ANAHI     A/P    ANAHI   worsening   etiology?   likely pre-renal, but need to r/o ATN    patient has a history of ATN; was on HD before   ultrasound shows no hydronephrosis   please get urine cr, urine na  check bladder scan to r/o urinary retention   clinically dehydrated  continue to hydrate with D/5 +75meq bicarb 100/hr x24hrs   spoke with his daughter Hunter Nathan for possible RRT   consent is in his chart   monitor BMP, U/O, volume status   Avoid further nephrotoxic medications, NSAIDS RCA     Lactic acidosis   etiology? likely 2/2 poor perfusion   not improving   continue to hydrate with D/5 +75meq bicarb 100/hr x24hrs    Hypernatremia   likely 2/2 dehydration   continue to hydrate with D/5 +75meq bicarb 100/hr x24hrs today   Repeat serum na t 5 pm and call Dr Blount with results at [phone]   urine na, urine osmol pending

## 2022-01-06 NOTE — PROGRESS NOTE ADULT - PROBLEM SELECTOR PLAN 11
reportedly chronic, unknown baseline  monitor/trend -reportedly chronic, unknown baseline  -monitor/trend

## 2022-01-07 NOTE — PROGRESS NOTE ADULT - PROBLEM SELECTOR PLAN 1
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

## 2022-01-07 NOTE — PROGRESS NOTE ADULT - PROBLEM SELECTOR PLAN 5
-likely in setting of poor PO intake/dehydration  - per nephro placed on 1/2NS with 75 bicarb @ 75cc/hr  - c/w IVF as per nephro -likely in setting of poor PO intake/dehydration  - per nephro placed on 1/4NS, will get repeat BMP today to assess  - c/w IVF as per nephro

## 2022-01-07 NOTE — PROGRESS NOTE ADULT - PROBLEM SELECTOR PLAN 6
-likely multifactorial in setting of sepsis/fungemia, electrolyte abnormality/dehydration, cirrhosis   -reportedly A&Ox2 at baseline  -Ammonia elevated, receiving rectal lactulose. f/u ammonia tomorrow  -B12, TSH normal.  -CTH complete, negative   -fall, aspiration precautions  -NPO for now except medications, dysphagia screen when able,   -Refused S&S participation  -PT consult

## 2022-01-07 NOTE — PROGRESS NOTE ADULT - ASSESSMENT
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 Patient noted to be hypotensive as well w/VS prior to transfer: 97.6  108  88/47  17  97%RA. Patient also noted to have decreased level of consciousness, generalized weakness. Nephrology consulted for ANAHI     A/P    NAAHI   etiology?   likely pre-renal, but need to r/o ATN    patient has a history of ATN; was on HD before   ultrasound shows no hydronephrosis   please get urine cr, urine na  clinically dehydrated  continue IVF ( 0.225% sodium chloride at 150cc for 12 hours )  spoke with his daughter Hunter Nathan for possible RRT   consent is in his chart   monitor BMP, U/O, volume status   Avoid further nephrotoxic medications, NSAIDS RCA     Lactic acidosis   etiology? likely 2/2 poor perfusion   continue IVF    Hypernatremia   likely 2/2 dehydration   Change IVF to 0.225% sodium chloride at 150cc for 12 hours   Repeat serum na at  6  pm and call Dr Blount with results at [phone]   urine na, urine osmol pending

## 2022-01-07 NOTE — PROGRESS NOTE ADULT - PROBLEM SELECTOR PLAN 7
- Baseline Cr .82 as of 8/2021, per daughter pt. received Abx treatment for OM that gave patient ATN with Cr 5-6 requiring HD in November/December, pt off HD for appx. 2 weeks.  -Cr elevated to 3.81  -monitor/trend renal function  -renally dose meds, avoid nephrotoxins  -monitor I/Os  -nephro following, may need HD - Baseline Cr .82 as of 8/2021, per daughter pt. received Abx treatment for OM that gave patient ATN with Cr 5-6 requiring HD in November/December, pt off HD for appx. 2 weeks.  -Cr uptrending, now 4.76  -monitor/trend renal function  -renally dose meds, avoid nephrotoxins  -monitor I/Os  -nephro following, may need HD

## 2022-01-07 NOTE — PROGRESS NOTE ADULT - SUBJECTIVE AND OBJECTIVE BOX
CC: F/U for COVID    Saw/spoke to patient. Unchanged. No new events, generally ill appearing, fatigued.    Allergies  daptomycin (Unknown)  ertapenem (Unknown)    ANTIMICROBIALS:  fluconAZOLE IVPB 200 every 24 hours    PE:    Vital Signs Last 24 Hrs  T(C): 36.5 (2022 10:15), Max: 36.6 (2022 21:50)  T(F): 97.7 (2022 10:15), Max: 97.8 (2022 21:50)  HR: 84 (2022 10:15) (78 - 90)  BP: 127/66 (2022 10:15) (118/61 - 127/66)  RR: 19 (2022 10:15) (18 - 19)  SpO2: 97% (2022 10:15) (97% - 98%)    Gen: AOx3, NAD, non-toxic, pleasant  CV: S1+S2 normal, nontachycardic  Resp: Clear bilat, no resp distress, no crackles/wheezes, RA  Abd: Soft, nontender, +BS  Ext: No LE edema, no wounds    LABS:                        10.6   3.77  )-----------( 81       ( 2022 06:31 )             33.7     -07    156<H>  |  120<H>  |  84<H>  ----------------------------<  147<H>  3.2<L>   |  16<L>  |  4.76<H>    Ca    8.1<L>      2022 06:31  Phos  5.1     -  Mg     2.10     -07    TPro  5.8<L>  /  Alb  2.4<L>  /  TBili  1.5<H>  /  DBili  x   /  AST  17  /  ALT  14  /  AlkPhos  266<H>      Urinalysis Basic - ( 2022 14:02 )    Color: Yellow / Appearance: Clear / S.019 / pH: x  Gluc: x / Ketone: Negative  / Bili: Negative / Urobili: <2 mg/dL   Blood: x / Protein: 30 mg/dL / Nitrite: Negative   Leuk Esterase: Negative / RBC: 11 /HPF / WBC 8 /HPF   Sq Epi: x / Non Sq Epi: 2 /HPF / Bacteria: Negative    MICROBIOLOGY:    .Blood Blood-Peripheral  22   No growth to date.     Clean Catch Clean Catch (Midstream)  22   No growth     Rapid RVP Result: Detected ( @ 21:14)--COVID    (otherwise reviewed)    RADIOLOGY:        5mm axial sections of the brain were obtained from base to vertex,   without the intravenous administration of contrast material. Coronal and   sagittal computer generated reconstructed views are available.    No prior brain imaging is available for comparison.        The fourth, third and lateral ventricles are normal size and position.   There is no hemorrhage, mass or shift of the midline structures. There is   normal gray white matter differentiation. Bone window examination is   unremarkable.    IMPRESSION: Unremarkable noncontrast CT of the brain.

## 2022-01-07 NOTE — PROGRESS NOTE ADULT - SUBJECTIVE AND OBJECTIVE BOX
Jose Kang MD  Internal Medicine PGY-1  Pager NS: [phone] // [facility]: [phone]    PROGRESS NOTE:     Patient is a 66y old  Male who presents with a chief complaint of poor PO intake/FTT (2022 12:50)      SUBJECTIVE / OVERNIGHT EVENTS:    No acute events overnight. Patient examined at bedside with no acute complaints.     REVIEW OF SYSTEMS:    CONSTITUTIONAL: No weakness, fevers or chills  EYES/ENT: No visual changes;  No vertigo or throat pain   NECK: No pain or stiffness  RESPIRATORY: No cough, wheezing, hemoptysis; No shortness of breath  CARDIOVASCULAR: No chest pain or palpitations  GASTROINTESTINAL: No abdominal or epigastric pain. No nausea, vomiting, or hematemesis; No diarrhea or constipation. No melena or hematochezia.  GENITOURINARY: No dysuria, frequency or hematuria  NEUROLOGICAL: No numbness or weakness  SKIN: No itching, rashes      MEDICATIONS  (STANDING):  dextrose 40% Gel 15 Gram(s) Oral once  dextrose 5%. 1000 milliLiter(s) (50 mL/Hr) IV Continuous <Continuous>  dextrose 5%. 1000 milliLiter(s) (75 mL/Hr) IV Continuous <Continuous>  dextrose 5%. 1000 milliLiter(s) (100 mL/Hr) IV Continuous <Continuous>  dextrose 50% Injectable 25 Gram(s) IV Push once  dextrose 50% Injectable 12.5 Gram(s) IV Push once  dextrose 50% Injectable 25 Gram(s) IV Push once  fluconAZOLE IVPB 200 milliGRAM(s) IV Intermittent every 24 hours  glucagon  Injectable 1 milliGRAM(s) IntraMuscular once  heparin   Injectable 5000 Unit(s) SubCutaneous every 12 hours  insulin lispro (ADMELOG) corrective regimen sliding scale   SubCutaneous every 6 hours  sodium chloride 0.225%. 1000 milliLiter(s) (100 mL/Hr) IV Continuous <Continuous>    MEDICATIONS  (PRN):      CAPILLARY BLOOD GLUCOSE      POCT Blood Glucose.: 125 mg/dL (2022 06:39)  POCT Blood Glucose.: 107 mg/dL (2022 23:58)  POCT Blood Glucose.: 181 mg/dL (2022 17:12)  POCT Blood Glucose.: 181 mg/dL (2022 12:08)    I&O's Summary      VITALS:   T(C): 36.6 (22 @ 21:50), Max: 36.6 (22 @ 21:50)  HR: 90 (22 @ 21:50) (88 - 90)  BP: 124/60 (22 @ 21:50) (118/66 - 124/60)  RR: 18 (22 @ 21:50) (17 - 18)  SpO2: 97% (22 @ 21:50) (97% - 99%)    GENERAL: NAD, lying in bed comfortably  HEAD:  Atraumatic, normocephalic  EYES: EOMI, PERRLA, conjunctiva and sclera clear  ENT: Moist mucous membranes  NECK: Supple, no JVD  HEART: Regular rate and rhythm, no murmurs, rubs, or gallops  LUNGS: Unlabored respirations.  Clear to auscultation bilaterally, no crackles, wheezing, or rhonchi  ABDOMEN: Soft, nontender, nondistended, +BS  EXTREMITIES: 2+ peripheral pulses bilaterally. No clubbing, cyanosis, or edema  NERVOUS SYSTEM:  A&Ox3, no focal deficits   SKIN: No rashes or lesions    LABS:                        10.6   3.77  )-----------( 81       ( 2022 06:31 )             33.7         156<H>  |  121<H>  |  78<H>  ----------------------------<  181<H>  2.9<LL>   |  19<L>  |  4.51<H>    Ca    8.2<L>      2022 17:28  Phos  4.9       Mg     2.20         TPro  6.1  /  Alb  2.3<L>  /  TBili  1.3<H>  /  DBili  x   /  AST  13  /  ALT  12  /  AlkPhos  242<H>      PT/INR - ( 2022 08:06 )   PT: 16.0 sec;   INR: 1.43 ratio               Urinalysis Basic - ( 2022 14:02 )    Color: Yellow / Appearance: Clear / S.019 / pH: x  Gluc: x / Ketone: Negative  / Bili: Negative / Urobili: <2 mg/dL   Blood: x / Protein: 30 mg/dL / Nitrite: Negative   Leuk Esterase: Negative / RBC: 11 /HPF / WBC 8 /HPF   Sq Epi: x / Non Sq Epi: 2 /HPF / Bacteria: Negative          RADIOLOGY & ADDITIONAL TESTS:  Results Reviewed:   Imaging Personally Reviewed:  Electrocardiogram Personally Reviewed:    COORDINATION OF CARE:  Care Discussed with Consultants/Other Providers [Y/N]:  Prior or Outpatient Records Reviewed [Y/N]:   Jose Kang MD  Internal Medicine PGY-1  Pager NS: [phone] // [facility]: [phone]    PROGRESS NOTE:     Patient is a 66y old  Male who presents with a chief complaint of poor PO intake/FTT (2022 12:50)      SUBJECTIVE / OVERNIGHT EVENTS:    No acute events overnight. Patient examined at bedside, slightly more responsive to verbal stimuli today but unable to form complete sentences - pt. mumbles when asked questions.     REVIEW OF SYSTEMS: Unable to be obtained due to pt. mental status.      MEDICATIONS  (STANDING):  dextrose 40% Gel 15 Gram(s) Oral once  dextrose 5%. 1000 milliLiter(s) (50 mL/Hr) IV Continuous <Continuous>  dextrose 5%. 1000 milliLiter(s) (75 mL/Hr) IV Continuous <Continuous>  dextrose 5%. 1000 milliLiter(s) (100 mL/Hr) IV Continuous <Continuous>  dextrose 50% Injectable 25 Gram(s) IV Push once  dextrose 50% Injectable 12.5 Gram(s) IV Push once  dextrose 50% Injectable 25 Gram(s) IV Push once  fluconAZOLE IVPB 200 milliGRAM(s) IV Intermittent every 24 hours  glucagon  Injectable 1 milliGRAM(s) IntraMuscular once  heparin   Injectable 5000 Unit(s) SubCutaneous every 12 hours  insulin lispro (ADMELOG) corrective regimen sliding scale   SubCutaneous every 6 hours  sodium chloride 0.225%. 1000 milliLiter(s) (100 mL/Hr) IV Continuous <Continuous>    MEDICATIONS  (PRN):      CAPILLARY BLOOD GLUCOSE      POCT Blood Glucose.: 125 mg/dL (2022 06:39)  POCT Blood Glucose.: 107 mg/dL (2022 23:58)  POCT Blood Glucose.: 181 mg/dL (2022 17:12)  POCT Blood Glucose.: 181 mg/dL (2022 12:08)    I&O's Summary      VITALS:   T(C): 36.6 (22 @ 21:50), Max: 36.6 (22 @ 21:50)  HR: 90 (22 @ 21:50) (88 - 90)  BP: 124/60 (22 @ 21:50) (118/66 - 124/60)  RR: 18 (22 @ 21:50) (17 - 18)  SpO2: 97% (22 @ 21:50) (97% - 99%)    GENERAL: NAD, lying in bed on his R side, mildly uncomfortable appearance  HEAD:  Atraumatic, normocephalic  NECK: Supple, no JVD  HEART: Regular rate and rhythm, no murmurs, rubs, or gallops  LUNGS: Unlabored respirations.  Clear to auscultation bilaterally, no crackles, wheezing, or rhonchi  ABDOMEN: Soft, nontender, nondistended, +BS  EXTREMITIES: 2+ peripheral pulses bilaterally. No clubbing, cyanosis, or edema  NERVOUS SYSTEM:  A&Ox0, responsive to verbal stimuli  SKIN: No rashes or lesions    LABS:                        10.6   3.77  )-----------( 81       ( 2022 06:31 )             33.7         156<H>  |  121<H>  |  78<H>  ----------------------------<  181<H>  2.9<LL>   |  19<L>  |  4.51<H>    Ca    8.2<L>      2022 17:28  Phos  4.9       Mg     2.20         TPro  6.1  /  Alb  2.3<L>  /  TBili  1.3<H>  /  DBili  x   /  AST  13  /  ALT  12  /  AlkPhos  242<H>      PT/INR - ( 2022 08:06 )   PT: 16.0 sec;   INR: 1.43 ratio        Urinalysis Basic - ( 2022 14:02 )    Color: Yellow / Appearance: Clear / S.019 / pH: x  Gluc: x / Ketone: Negative  / Bili: Negative / Urobili: <2 mg/dL   Blood: x / Protein: 30 mg/dL / Nitrite: Negative   Leuk Esterase: Negative / RBC: 11 /HPF / WBC 8 /HPF   Sq Epi: x / Non Sq Epi: 2 /HPF / Bacteria: Negative          RADIOLOGY & ADDITIONAL TESTS:  Results Reviewed:   Imaging Personally Reviewed:  Electrocardiogram Personally Reviewed:    COORDINATION OF CARE:  Care Discussed with Consultants/Other Providers [Y/N]:  Prior or Outpatient Records Reviewed [Y/N]:

## 2022-01-07 NOTE — PROGRESS NOTE ADULT - ASSESSMENT
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 AAO2 baseline.  o/e today R weakness, no verbal ?aphasic. gen weakness confused   B12 and TSH WNL   NH3 106  +thrombocytopenia  abnormal coags.   alk phos elevated   CRP elevated 145  +COVID  Ct abd with known L4/5 OM and lung disease   CTH unremarkable 1/5   A1c 4.9  Impression: AMS likely multifactorial from toxic metabolic encephalopathy from infection on top of hepatic encephalopathy.     - c/w caspofungin for OM  - treatment of infection/covid per primary team  - monitor inflammatory markers and resp status   - lactulose  - check NH3 again   - if no improvement MRI brain and EEG  - consider ASA if CTH shows infarct. and statin therapy  -  lipid panel  - TTE, tried unable patient not cooperative   - telemetry  - PT/OT/SS/SLP, OOBC  - check FS, glucose control <180  - GI/DVT ppx  - Differential diagnosis and plan of care discussed with patient and/or family and primary team  - Thank you for allowing me to participate in the care of this patient. Call with questions.   Jr Garibay MD  Vascular Neurology  Office: [phone]

## 2022-01-07 NOTE — PROGRESS NOTE ADULT - SUBJECTIVE AND OBJECTIVE BOX
Neurology Progress Note    S: Patient seen and examined in covid unit. No new events overnight. in obs room     Medication:    MEDICATIONS  (STANDING):  dextrose 40% Gel 15 Gram(s) Oral once  dextrose 5%. 1000 milliLiter(s) (50 mL/Hr) IV Continuous <Continuous>  dextrose 5%. 1000 milliLiter(s) (75 mL/Hr) IV Continuous <Continuous>  dextrose 5%. 1000 milliLiter(s) (100 mL/Hr) IV Continuous <Continuous>  dextrose 50% Injectable 25 Gram(s) IV Push once  dextrose 50% Injectable 12.5 Gram(s) IV Push once  dextrose 50% Injectable 25 Gram(s) IV Push once  fluconAZOLE IVPB 200 milliGRAM(s) IV Intermittent every 24 hours  glucagon  Injectable 1 milliGRAM(s) IntraMuscular once  heparin   Injectable 5000 Unit(s) SubCutaneous every 12 hours  insulin lispro (ADMELOG) corrective regimen sliding scale   SubCutaneous every 6 hours  sodium chloride 0.225%. 1000 milliLiter(s) (150 mL/Hr) IV Continuous <Continuous>    MEDICATIONS  (PRN):    Vitals:    Vital Signs Last 24 Hrs  T(C): 36.3 (22 @ 05:50), Max: 36.6 (22 @ 21:50)  T(F): 97.4 (22 @ 05:50), Max: 97.8 (22 @ 21:50)  HR: 78 (22 @ 05:50) (78 - 90)  BP: 118/61 (22 @ 05:50) (118/61 - 124/60)  BP(mean): --  RR: 19 (22 @ 05:50) (17 - 19)  SpO2: 98% (22 @ 05:50) (97% - 99%)          General Exam:   General Appearance: Appropriately dressed and in no acute distress       Head: Normocephalic, atraumatic and no dysmorphic features  Ear, Nose, and Throat: Moist mucous membranes  CVS: S1S2+  Resp: No SOB, no wheeze or rhonchi  GI: soft NT/ND  Extremities: No edema or cyanosis  Skin: No bruises or rashes     Neurological Exam:  Mental Status: Awake, alert and oriented x 0-1.  no verbal. not following but tries to mimics.   Cranial Nerves: PERRL, EOMI, VFFC, sensation V1-V3 intact,  R? facial asymmetry, equal elevation of palate, scm/trap 5/5, tongue is midline on protrusion.   hearing is grossly intact.   Motor: generlzied weakness. moving L>R and uppers > lowerse   Sensation: withdraws x4  Reflexes: 1+ throughout at biceps, brachioradialis, triceps, patellars and ankles bilaterally and equal. No clonus.   Coordination: unable   Gait: unable     I personally reviewed the below data/images/labs:    CBC Full  -  ( 2022 06:31 )  WBC Count : 3.77 K/uL  RBC Count : 3.65 M/uL  Hemoglobin : 10.6 g/dL  Hematocrit : 33.7 %  Platelet Count - Automated : 81 K/uL  Mean Cell Volume : 92.3 fL  Mean Cell Hemoglobin : 29.0 pg  Mean Cell Hemoglobin Concentration : 31.5 gm/dL  Auto Neutrophil # : x  Auto Lymphocyte # : x  Auto Monocyte # : x  Auto Eosinophil # : x  Auto Basophil # : x  Auto Neutrophil % : x  Auto Lymphocyte % : x  Auto Monocyte % : x  Auto Eosinophil % : x  Auto Basophil % : x        156<H>  |  120<H>  |  84<H>  ----------------------------<  147<H>  3.2<L>   |  16<L>  |  4.76<H>    Ca    8.1<L>      2022 06:31  Phos  5.1       Mg     2.10         TPro  5.8<L>  /  Alb  2.4<L>  /  TBili  1.5<H>  /  DBili  x   /  AST  17  /  ALT  14  /  AlkPhos  266<H>      LIVER FUNCTIONS - ( 2022 08:06 )  Alb: 2.4 g/dL / Pro: 6.7 g/dL / ALK PHOS: 249 U/L / ALT: 12 U/L / AST: 17 U/L / GGT: x           PT/INR - ( 2022 08:06 )   PT: 16.0 sec;   INR: 1.43 ratio         PTT - ( 2022 20:23 )  PTT:31.7 sec  Urinalysis Basic - ( 2022 20:56 )    Color: Yellow / Appearance: Clear / S.019 / pH: x  Gluc: x / Ketone: Negative  / Bili: Negative / Urobili: <2 mg/dL   Blood: x / Protein: 30 mg/dL / Nitrite: Negative   Leuk Esterase: Negative / RBC: 2 /HPF / WBC 10-20 /HPF   Sq Epi: x / Non Sq Epi: 1 /HPF / Bacteria: Few    < from: CT Head No Cont (22 @ 10:18) >    ACC: [id number] EXAM:  CT BRAIN                          PROCEDURE DATE:  2022          INTERPRETATION:  CLINICAL INDICATION: Altered mental status, cirrhosis,   portal hypertension    5mm axial sections of the brain were obtained from base to vertex,   without the intravenous administration of contrast material. Coronal and   sagittal computer generated reconstructed views are available.    No prior brain imaging is available for comparison.        The fourth, third and lateral ventricles are normal size and position.   There is no hemorrhage, mass or shift of the midline structures. There is   normal gray white matter differentiation. Bone window examination is   unremarkable.    IMPRESSION: Unremarkable noncontrast CT of the brain.    --- End of Report ---            ELZBIETA GEE MD; Attending Radiologist  This document has been electronically signed. 2022 10:25AM    < end of copied text >

## 2022-01-07 NOTE — PROGRESS NOTE ADULT - ASSESSMENT
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), T2DM, cirrhosis (w/ gastric varices), and anemia/thrombocytopenia, CKD, lumbar OM, presenting from NH due to poor PO intake and removal of midline  Leukocytosis, no fever  COVID+  Outside diagnosis of Candidemia on treatment with Ampho B through 1/17  Now elevated Cr  Culture from OSH shows Candida S to Flu and Caspo, can avoid Ampho B  Overall,  1) Candidemia  - Appears S to caspo/Flu  - Fluconazole 200mg q 24 (give every day but after HD on dialysis days) through 1/17/21 per OSH duration  - F/U pending BCXs  - Check TTE, if not able to tolerate can hold off  2) COVID  - RA  - Hold on RemD/Dexa unless progress O2 requirements  - Supportive care  3) Leukocytosis  - Trend to normal  - Monitor for alternate signs infection    Signing off. Please call with further questions or change in status.    Jose Fox MD  Pager [phone]  From 5pm-9am, and on weekends call [phone]

## 2022-01-07 NOTE — PROGRESS NOTE ADULT - SUBJECTIVE AND OBJECTIVE BOX
[facility]   MD DEMARCUS NORMAN MD RUORU WONG, PA    TEL:  OFFICE: [phone]  DR AGUAYO CELL: [phone]  KEN RUBY CELL: [phone]  DR. QUINTERO CELL: [phone]      FROM 5 PM - 7 AM PLEASE CALL ANSWERING SERVICE: [phone]    RENAL FOLLOW UP NOTE--Date of Service 01-07-22 @ 08:45  --------------------------------------------------------------------------------  HPI:      Pt seen and examined at bedside.       PAST HISTORY  --------------------------------------------------------------------------------  No significant changes to PMH, PSH, FHx, SHx, unless otherwise noted    ALLERGIES & MEDICATIONS  --------------------------------------------------------------------------------  Allergies    daptomycin (Unknown)  ertapenem (Unknown)    Intolerances      Standing Inpatient Medications  dextrose 40% Gel 15 Gram(s) Oral once  dextrose 5%. 1000 milliLiter(s) IV Continuous <Continuous>  dextrose 5%. 1000 milliLiter(s) IV Continuous <Continuous>  dextrose 5%. 1000 milliLiter(s) IV Continuous <Continuous>  dextrose 50% Injectable 25 Gram(s) IV Push once  dextrose 50% Injectable 12.5 Gram(s) IV Push once  dextrose 50% Injectable 25 Gram(s) IV Push once  fluconAZOLE IVPB 200 milliGRAM(s) IV Intermittent every 24 hours  glucagon  Injectable 1 milliGRAM(s) IntraMuscular once  heparin   Injectable 5000 Unit(s) SubCutaneous every 12 hours  insulin lispro (ADMELOG) corrective regimen sliding scale   SubCutaneous every 6 hours  potassium chloride  10 mEq/100 mL IVPB 10 milliEquivalent(s) IV Intermittent every 1 hour  sodium chloride 0.225%. 1000 milliLiter(s) IV Continuous <Continuous>    PRN Inpatient Medications      REVIEW OF SYSTEMS  --------------------------------------------------------------------------------  General: no fever  MSK: no edema     VITALS/PHYSICAL EXAM  --------------------------------------------------------------------------------  T(C): 36.3 (01-07-22 @ 05:50), Max: 36.6 (01-06-22 @ 21:50)  HR: 78 (01-07-22 @ 05:50) (78 - 90)  BP: 118/61 (01-07-22 @ 05:50) (118/61 - 124/60)  RR: 19 (01-07-22 @ 05:50) (17 - 19)  SpO2: 98% (01-07-22 @ 05:50) (97% - 99%)  Wt(kg): --        01-06-22 @ 07:01  -  01-07-22 @ 07:00  --------------------------------------------------------  IN: 0 mL / OUT: 200 mL / NET: -200 mL      Physical Exam:  	Gen: NAD  	HEENT: MMM  	Pulm: CTA B/L  	CV: S1S2  	Abd: Soft, +BS  	Ext: No LE edema B/L                      Neuro: Awake   	Skin: Warm and Dry   	Vascular access: NO HD catheter           SALUD hylton  LABS/STUDIES  --------------------------------------------------------------------------------              10.6   3.77  >-----------<  81       [01-07-22 @ 06:31]              33.7     156  |  120  |  84  ----------------------------<  147      [01-07-22 @ 06:31]  3.2   |  16  |  4.76        Ca     8.1     [01-07-22 @ 06:31]      Mg     2.10     [01-07-22 @ 06:31]      Phos  5.1     [01-07-22 @ 06:31]    TPro  5.8  /  Alb  2.4  /  TBili  1.5  /  DBili  x   /  AST  17  /  ALT  14  /  AlkPhos  266  [01-07-22 @ 06:31]          Creatinine Trend:  SCr 4.76 [01-07 @ 06:31]  SCr 4.51 [01-06 @ 17:28]  SCr 4.45 [01-06 @ 07:48]  SCr 4.16 [01-05 @ 08:06]  SCr 3.85 [01-04 @ 17:28]    Urinalysis - [01-06-22 @ 14:02]      Color Yellow / Appearance Clear / SG 1.019 / pH 6.0      Gluc Negative / Ketone Negative  / Bili Negative / Urobili <2 mg/dL       Blood Moderate / Protein 30 mg/dL / Leuk Est Negative / Nitrite Negative      RBC 11 / WBC 8 / Hyaline 18 / Gran  / Sq Epi  / Non Sq Epi 2 / Bacteria Negative    Urine Creatinine 165      [01-06-22 @ 14:02]  Urine Sodium <20      [01-06-22 @ 14:02]  Urine Osmolality 405      [01-06-22 @ 14:02]    Ferritin 1846      [01-04-22 @ 08:10]  TSH 1.33      [01-04-22 @ 08:10]    HCV 0.53, Nonreact      [01-05-22 @ 10:37]

## 2022-01-07 NOTE — PROGRESS NOTE ADULT - PROBLEM SELECTOR PLAN 2
-pt. with Hx candida parasilosis fungemia  -ID consulted, per recs will order 70mgx1 caspofungin and then 50mg daily.   -received sensitivites from [facility] laboratory, sent to ID fellow.  -BCX NGTD, f/u fungal cx  -TTE  -will try and get more collateral regarding fungemia -pt. with Hx candida parasilosis fungemia  -ID consulted, prev on caspofungin but now converted to fluconazole 200mg daily.   -received sensitivites from [facility] laboratory, sent to ID fellow.  -BCX NGTD, f/u fungal cx  -TTE  -will try and get more collateral regarding fungemia

## 2022-01-07 NOTE — PROGRESS NOTE ADULT - PROBLEM SELECTOR PLAN 3
-incentive spirometry when able  -SpO2 % on RA, not meeting criteria for steroids or remdesivir  -inflammatory markers pending  -CXR negative  -isolation precautions  -DVT PPX: can restart on heparin subq given ANAHI

## 2022-01-08 NOTE — DIETITIAN NUTRITION RISK NOTIFICATION - ADDITIONAL COMMENTS/DIETITIAN RECOMMENDATIONS
Please see Dietitian Initial Assessment for complete recommendations.  Kirsten Couch, TANYAN, CDN #[id number]

## 2022-01-08 NOTE — PROVIDER CONTACT NOTE (OTHER) - BACKGROUND
Pt was admitted for failure to thrive and has a hx of CKD
66 y M phmx Osteomyelitis of lumbar spine, liver cirrhosis, CKD, T2DM. Admitted for Failure to thrive in adult

## 2022-01-08 NOTE — PROGRESS NOTE ADULT - REASON FOR ADMISSION
poor PO intake/FTT

## 2022-01-08 NOTE — DIETITIAN INITIAL EVALUATION ADULT. - CALCULATED FROM (G/KG)
Reduce insulin to 10U    Ankle Exercises   AMBULATORY CARE:   What you need to know about ankle exercises: Ankle exercises help strengthen your ankle and improve its function after injury  These are beginning exercises  Ask your healthcare provider if you need to see a physical therapist for more advanced exercises  · Do these exercises 3 to 5 days a week , or as directed by your healthcare provider  Ask if you should perform the exercises on each ankle  · Do the exercises in the order that your healthcare provider recommends  This will help prevent swelling, chronic pain, and reinjury  Start with range of motion exercises  Then progress to strengthening exercises, and finally to balancing exercises  · Warm up before you do ankle exercises  Walk or ride a stationary bike for 5 to 10 minutes to prepare your ankle for movement  · Stop if you feel pain  It is normal to feel some discomfort at first  Regular exercise will help decrease your discomfort over time  How to perform range of motion exercises safely:  Begin with range of motion exercises to improve flexibility  Ask your healthcare provider when you can progress to strengthening exercises  · Ankle alphabet:  Sit on a chair so that your feet do not touch the floor  Use your big toe to write each letter of the alphabet  Use only your foot and ankle, and keep your movements small  Do 2 sets  · Calf stretches:      ? Sitting calf stretches with a towel:  Sit on the floor with both legs out straight in front of you  Loop a towel around the ball of your injured foot  Grasp the ends of the towel and pull it toward you  Keep your leg and back straight  Do not lean forward as you pull the towel  Hold for 30 seconds  Then relax for 30 seconds  Do 2 sets of 10          ? Standing calf stretches:  Stand facing a wall with the foot that is not injured forward and your knee slightly bent   Keep the leg with the injured foot straight and behind you with your toes pointed in slightly  With both heels flat on the floor, press your hips forward  Do not arch your back  Hold for 30 seconds, and then relax for 30 seconds  Do 2 sets of 10  Repeat with your leg bent  Do 2 sets of 10  How to perform strengthening exercises safely:  After you can perform range of motion exercises without pain, you may begin strengthening exercises  Ask your healthcare provider when you can progress to balancing exercises  · Ankle movement in 4 directions:  Sit on the floor with your legs straight in front of you  Keep your heels on the floor for support  ? Dorsiflexion:  Begin with your toes pointing straight up  Pull your toes toward your body  Slowly return to the starting position  Do 3 sets of 5      ? Plantar flexion:  Begin with your toes pointing straight up  Push your toes away from your body  Slowly return to the starting position  Do 3 sets of 5          ? Inversion:  Begin with your toes pointing straight up  Push your toes inward, toward each other  Slowly return to the starting position  Do 3 sets of 5      ? Eversion:  Begin with your toes pointing straight up  Push your toes outward, away from each other  Slowly return to the starting position  Do 3 sets of 5        · Toe curls with a towel:  Sit on a chair so that both of your feet are flat on the floor  Place a small towel on the floor in front of your injured foot  Grab the center of the towel with your toes and curl the towel toward you  Relax and repeat  Do 1 set of 5          · [place] pick-ups:  Sit on a chair so that both of your feet are flat on the floor  Place 20 marbles on the floor in front of your injured foot  Use your toes to  one marble at a time and place it into a bowl  Repeat until you have picked up all the marbles  Do 1 set  · Heel raises:      ? Single leg heel raises:  Stand with your weight evenly on both feet  Hold on to a chair or a wall for balance   Lift the foot that is not injured off the floor so all your weight is placed on your injured foot  Raise the heel of your injured foot as high as you can  Slowly lower your heel to the floor  Do 1 set of 10          ? Double leg heel raises:  Stand with your weight evenly on both feet  Hold on to a chair or a wall for balance  Raise both of your heels as high as you can  Slowly lower your heels to the floor  Do 1 set of 10  · Heel and toe walks:      ? Heel walks:  Begin in a standing position  Lift your toes off the floor and walk on your heels  Keep your toes lifted as high as possible  Do 2 sets of 10          ? Toe walks:  Begin in a standing position  Lift your heels off the floor and walk on the balls and toes of your feet  Keep your heels lifted as high as possible  Do 2 sets of 10  How to perform a balance exercise safely:  After you can perform strengthening exercises without pain, you may do this beginning balancing exercise  Ask your healthcare provider for more advanced balance exercises  · Single leg stance:  Stand with your weight evenly on both feet, or hold on to a chair or a wall  Do not lean to the side  Lift the foot that is not injured off the floor so all your weight is placed on your injured foot  Balance on your injured foot  Ask your healthcare provider how long to hold this position  Contact your healthcare provider if:   · Your pain becomes worse  · You have new pain  · You have questions or concerns about your condition, care, or exercise program     © Copyright [employer] 2021 Information is for End User's use only and may not be sold, redistributed or otherwise used for commercial purposes  All illustrations and images included in CareNotes® are the copyrighted property of A D A M [employer] Inc  or Emmanuel Curry   The above information is an  only  It is not intended as medical advice for individual conditions or treatments   Talk to your doctor, nurse or pharmacist before following any medical regimen to see if it is safe and effective for you  68.52

## 2022-01-08 NOTE — PROGRESS NOTE ADULT - PROBLEM SELECTOR PLAN 8
-check ammonia, elevated to 106  -refused S&S, will give rectal lactulose  -dose meds for decreased hepatic function  -prev hx paracentesis in past, unclear etiology of cirrhosis. -check ammonia, elevated to 106, decreased to 65 1/7.  -refused S&S, will give rectal lactulose and convert to oral if NG tube placed.  -dose meds for decreased hepatic function  -prev hx paracentesis in past, unclear etiology of cirrhosis.

## 2022-01-08 NOTE — DIETITIAN INITIAL EVALUATION ADULT. - ENTERAL
1) If and when plan to initiate EN, recommend Nepro 1.8 via tolerated route initiated at 10mL/hr increased by 5mL q4 hrs or as tolerated to goal rate 40mL/hr x24 hrs to provide 960mL formula, 1728 kcal, 77.76 gm protein, 697.92mL free water (meets 30.2 kcal/kg, 1.36 gm protein/kg @dosing weight 57.1 kg). Additional provision of free water per medical discretion.

## 2022-01-08 NOTE — PROGRESS NOTE ADULT - NSPROGADDITIONALINFOA_GEN_ALL_CORE
- Counseling on diet, exercise, and medication adherence was done  - Counseling on smoking cessation and alcohol consumption offered when appropriate.  - Pain assessed and judicious use of narcotics when appropriate was discussed.    - Stroke education given when appropriate.  - Importance of fall prevention discussed.

## 2022-01-08 NOTE — PROGRESS NOTE ADULT - PROBLEM SELECTOR PROBLEM 4
Chronic osteomyelitis of lumbar spine

## 2022-01-08 NOTE — PROGRESS NOTE ADULT - SUBJECTIVE AND OBJECTIVE BOX
Jose Kang MD  Internal Medicine PGY-1  Pager NS: [phone] // [facility]: [phone]    PROGRESS NOTE:     Patient is a 66y old  Male who presents with a chief complaint of poor PO intake/FTT (2022 09:06)      SUBJECTIVE / OVERNIGHT EVENTS:    Overnight patient pulled NG tube out that was placed yesterday.     REVIEW OF SYSTEMS:    CONSTITUTIONAL: No weakness, fevers or chills  EYES/ENT: No visual changes;  No vertigo or throat pain   NECK: No pain or stiffness  RESPIRATORY: No cough, wheezing, hemoptysis; No shortness of breath  CARDIOVASCULAR: No chest pain or palpitations  GASTROINTESTINAL: No abdominal or epigastric pain. No nausea, vomiting, or hematemesis; No diarrhea or constipation. No melena or hematochezia.  GENITOURINARY: No dysuria, frequency or hematuria  NEUROLOGICAL: No numbness or weakness  SKIN: No itching, rashes      MEDICATIONS  (STANDING):  dextrose 40% Gel 15 Gram(s) Oral once  dextrose 5%. 1000 milliLiter(s) (50 mL/Hr) IV Continuous <Continuous>  dextrose 5%. 1000 milliLiter(s) (75 mL/Hr) IV Continuous <Continuous>  dextrose 5%. 1000 milliLiter(s) (100 mL/Hr) IV Continuous <Continuous>  dextrose 50% Injectable 25 Gram(s) IV Push once  dextrose 50% Injectable 12.5 Gram(s) IV Push once  dextrose 50% Injectable 25 Gram(s) IV Push once  fluconAZOLE IVPB 200 milliGRAM(s) IV Intermittent every 24 hours  glucagon  Injectable 1 milliGRAM(s) IntraMuscular once  heparin   Injectable 5000 Unit(s) SubCutaneous every 12 hours  insulin lispro (ADMELOG) corrective regimen sliding scale   SubCutaneous every 6 hours  sodium chloride 0.225%. 1000 milliLiter(s) (150 mL/Hr) IV Continuous <Continuous>    MEDICATIONS  (PRN):      CAPILLARY BLOOD GLUCOSE      POCT Blood Glucose.: 152 mg/dL (2022 06:18)  POCT Blood Glucose.: 118 mg/dL (2022 00:12)  POCT Blood Glucose.: 135 mg/dL (2022 21:08)  POCT Blood Glucose.: 122 mg/dL (2022 17:51)  POCT Blood Glucose.: 129 mg/dL (2022 13:26)    I&O's Summary    2022 07:01  -  2022 07:00  --------------------------------------------------------  IN: 0 mL / OUT: 400 mL / NET: -400 mL        VITALS:   T(C): 36.3 (22 @ 21:00), Max: 36.5 (22 @ 10:15)  HR: 98 (22 @ 21:00) (84 - 98)  BP: 116/68 (22 @ 21:00) (116/68 - 127/66)  RR: 19 (22 @ 21:00) (19 - 19)  SpO2: 100% (22 @ 21:00) (97% - 100%)    GENERAL: NAD, lying in bed comfortably  HEAD:  Atraumatic, normocephalic  EYES: EOMI, PERRLA, conjunctiva and sclera clear  ENT: Moist mucous membranes  NECK: Supple, no JVD  HEART: Regular rate and rhythm, no murmurs, rubs, or gallops  LUNGS: Unlabored respirations.  Clear to auscultation bilaterally, no crackles, wheezing, or rhonchi  ABDOMEN: Soft, nontender, nondistended, +BS  EXTREMITIES: 2+ peripheral pulses bilaterally. No clubbing, cyanosis, or edema  NERVOUS SYSTEM:  A&Ox3, no focal deficits   SKIN: No rashes or lesions    LABS:                        10.6   3.77  )-----------( 81       ( 2022 06:31 )             33.7         153<H>  |  119<H>  |  83<H>  ----------------------------<  142<H>  3.8   |  14<L>  |  4.80<H>    Ca    8.0<L>      2022 17:52  Phos  5.4       Mg     2.00         TPro  5.8<L>  /  Alb  2.4<L>  /  TBili  1.5<H>  /  DBili  x   /  AST  17  /  ALT  14  /  AlkPhos  266<H>            Urinalysis Basic - ( 2022 14:02 )    Color: Yellow / Appearance: Clear / S.019 / pH: x  Gluc: x / Ketone: Negative  / Bili: Negative / Urobili: <2 mg/dL   Blood: x / Protein: 30 mg/dL / Nitrite: Negative   Leuk Esterase: Negative / RBC: 11 /HPF / WBC 8 /HPF   Sq Epi: x / Non Sq Epi: 2 /HPF / Bacteria: Negative          RADIOLOGY & ADDITIONAL TESTS:  Results Reviewed:   Imaging Personally Reviewed:  Electrocardiogram Personally Reviewed:    COORDINATION OF CARE:  Care Discussed with Consultants/Other Providers [Y/N]:  Prior or Outpatient Records Reviewed [Y/N]:   Jose Kang MD  Internal Medicine PGY-1  Pager NS: [phone] // [facility]: [phone]    PROGRESS NOTE:     Patient is a 66y old  Male who presents with a chief complaint of poor PO intake/FTT (2022 09:06)      SUBJECTIVE / OVERNIGHT EVENTS:    Overnight patient pulled NG tube out that was placed yesterday. Will try to replace at some point today and order unsecured mittens to prevent removal. Patient was more responsive today, oriented to self and able to express he wasn't feeling well. No new complaints overnight.    REVIEW OF SYSTEMS: Unable to be obtained due to patient mental status.        MEDICATIONS  (STANDING):  dextrose 40% Gel 15 Gram(s) Oral once  dextrose 5%. 1000 milliLiter(s) (50 mL/Hr) IV Continuous <Continuous>  dextrose 5%. 1000 milliLiter(s) (75 mL/Hr) IV Continuous <Continuous>  dextrose 5%. 1000 milliLiter(s) (100 mL/Hr) IV Continuous <Continuous>  dextrose 50% Injectable 25 Gram(s) IV Push once  dextrose 50% Injectable 12.5 Gram(s) IV Push once  dextrose 50% Injectable 25 Gram(s) IV Push once  fluconAZOLE IVPB 200 milliGRAM(s) IV Intermittent every 24 hours  glucagon  Injectable 1 milliGRAM(s) IntraMuscular once  heparin   Injectable 5000 Unit(s) SubCutaneous every 12 hours  insulin lispro (ADMELOG) corrective regimen sliding scale   SubCutaneous every 6 hours  sodium chloride 0.225%. 1000 milliLiter(s) (150 mL/Hr) IV Continuous <Continuous>    MEDICATIONS  (PRN):      CAPILLARY BLOOD GLUCOSE      POCT Blood Glucose.: 152 mg/dL (2022 06:18)  POCT Blood Glucose.: 118 mg/dL (2022 00:12)  POCT Blood Glucose.: 135 mg/dL (2022 21:08)  POCT Blood Glucose.: 122 mg/dL (2022 17:51)  POCT Blood Glucose.: 129 mg/dL (2022 13:26)    I&O's Summary    2022 07:01  -  2022 07:00  --------------------------------------------------------  IN: 0 mL / OUT: 400 mL / NET: -400 mL        VITALS:   T(C): 36.3 (22 @ 21:00), Max: 36.5 (22 @ 10:15)  HR: 98 (22 @ 21:00) (84 - 98)  BP: 116/68 (22 @ 21:00) (116/68 - 127/66)  RR: 19 (22 @ 21:00) (19 - 19)  SpO2: 100% (22 @ 21:00) (97% - 100%)    GENERAL: NAD, lying in bed comfortably  HEAD:  Atraumatic, normocephalic  EYES: EOMI, PERRLA, conjunctiva and sclera clear  ENT: Moist mucous membranes  NECK: Supple, no JVD  HEART: Regular rate and rhythm, no murmurs, rubs, or gallops  LUNGS: Unlabored respirations.  Clear to auscultation bilaterally, no crackles, wheezing, or rhonchi  ABDOMEN: Soft, nontender, nondistended, +BS  EXTREMITIES: 2+ peripheral pulses bilaterally. No clubbing, cyanosis, or edema  NERVOUS SYSTEM:  A&Ox3, no focal deficits   SKIN: No rashes or lesions    LABS:                        10.6   3.77  )-----------( 81       ( 2022 06:31 )             33.7         153<H>  |  119<H>  |  83<H>  ----------------------------<  142<H>  3.8   |  14<L>  |  4.80<H>    Ca    8.0<L>      2022 17:52  Phos  5.4       Mg     2.00         TPro  5.8<L>  /  Alb  2.4<L>  /  TBili  1.5<H>  /  DBili  x   /  AST  17  /  ALT  14  /  AlkPhos  266<H>            Urinalysis Basic - ( 2022 14:02 )    Color: Yellow / Appearance: Clear / S.019 / pH: x  Gluc: x / Ketone: Negative  / Bili: Negative / Urobili: <2 mg/dL   Blood: x / Protein: 30 mg/dL / Nitrite: Negative   Leuk Esterase: Negative / RBC: 11 /HPF / WBC 8 /HPF   Sq Epi: x / Non Sq Epi: 2 /HPF / Bacteria: Negative          RADIOLOGY & ADDITIONAL TESTS:  Results Reviewed:   Imaging Personally Reviewed:  Electrocardiogram Personally Reviewed:    COORDINATION OF CARE:  Care Discussed with Consultants/Other Providers [Y/N]:  Prior or Outpatient Records Reviewed [Y/N]:   Jose Kang MD  Internal Medicine PGY-1  Pager NS: [phone] // [facility]: [phone]    PROGRESS NOTE:     Patient is a 66y old  Male who presents with a chief complaint of poor PO intake/FTT (2022 09:06)      SUBJECTIVE / OVERNIGHT EVENTS:    Overnight patient pulled NG tube out that was placed yesterday. Will try to replace at some point today and order unsecured mittens to prevent removal. Patient was more responsive today, oriented to self and able to express he wasn't feeling well. No new complaints overnight.    REVIEW OF SYSTEMS: Unable to be obtained due to patient mental status.    MEDICATIONS  (STANDING):  dextrose 40% Gel 15 Gram(s) Oral once  dextrose 5%. 1000 milliLiter(s) (50 mL/Hr) IV Continuous <Continuous>  dextrose 5%. 1000 milliLiter(s) (75 mL/Hr) IV Continuous <Continuous>  dextrose 5%. 1000 milliLiter(s) (100 mL/Hr) IV Continuous <Continuous>  dextrose 50% Injectable 25 Gram(s) IV Push once  dextrose 50% Injectable 12.5 Gram(s) IV Push once  dextrose 50% Injectable 25 Gram(s) IV Push once  fluconAZOLE IVPB 200 milliGRAM(s) IV Intermittent every 24 hours  glucagon  Injectable 1 milliGRAM(s) IntraMuscular once  heparin   Injectable 5000 Unit(s) SubCutaneous every 12 hours  insulin lispro (ADMELOG) corrective regimen sliding scale   SubCutaneous every 6 hours  sodium chloride 0.225%. 1000 milliLiter(s) (150 mL/Hr) IV Continuous <Continuous>    MEDICATIONS  (PRN):      CAPILLARY BLOOD GLUCOSE      POCT Blood Glucose.: 152 mg/dL (2022 06:18)  POCT Blood Glucose.: 118 mg/dL (2022 00:12)  POCT Blood Glucose.: 135 mg/dL (2022 21:08)  POCT Blood Glucose.: 122 mg/dL (2022 17:51)  POCT Blood Glucose.: 129 mg/dL (2022 13:26)    I&O's Summary    2022 07:01  -  2022 07:00  --------------------------------------------------------  IN: 0 mL / OUT: 400 mL / NET: -400 mL        VITALS:   T(C): 36.3 (22 @ 21:00), Max: 36.5 (22 @ 10:15)  HR: 98 (22 @ 21:00) (84 - 98)  BP: 116/68 (22 @ 21:00) (116/68 - 127/66)  RR: 19 (22 @ 21:00) (19 - 19)  SpO2: 100% (22 @ 21:00) (97% - 100%)    Physical Exam  GENERAL: NAD, lying in bed comfortably  HEAD:  Atraumatic, normocephalic  EYES: EOMI, PERRLA, conjunctiva and sclera clear  ENT: Moist mucous membranes  NECK: Supple, no JVD  HEART: Regular rate and rhythm, no murmurs, rubs, or gallops  LUNGS: Unlabored respirations.  Clear to auscultation bilaterally, no crackles, wheezing, or rhonchi  ABDOMEN: Soft, nontender, nondistended, +BS  EXTREMITIES: 2+ peripheral pulses bilaterally. No clubbing, cyanosis, or edema  NERVOUS SYSTEM:  A&Ox3, no focal deficits   SKIN: No rashes or lesions    LABS:                        10.6   3.77  )-----------( 81       ( 2022 06:31 )             33.7     -    153<H>  |  119<H>  |  83<H>  ----------------------------<  142<H>  3.8   |  14<L>  |  4.80<H>    Ca    8.0<L>      2022 17:52  Phos  5.4       Mg     2.00         TPro  5.8<L>  /  Alb  2.4<L>  /  TBili  1.5<H>  /  DBili  x   /  AST  17  /  ALT  14  /  AlkPhos  266<H>            Urinalysis Basic - ( 2022 14:02 )    Color: Yellow / Appearance: Clear / S.019 / pH: x  Gluc: x / Ketone: Negative  / Bili: Negative / Urobili: <2 mg/dL   Blood: x / Protein: 30 mg/dL / Nitrite: Negative   Leuk Esterase: Negative / RBC: 11 /HPF / WBC 8 /HPF   Sq Epi: x / Non Sq Epi: 2 /HPF / Bacteria: Negative          RADIOLOGY & ADDITIONAL TESTS:  Results Reviewed:   Imaging Personally Reviewed:  Electrocardiogram Personally Reviewed:    COORDINATION OF CARE:  Care Discussed with Consultants/Other Providers [Y/N]:  Prior or Outpatient Records Reviewed [Y/N]:

## 2022-01-08 NOTE — PROGRESS NOTE ADULT - SUBJECTIVE AND OBJECTIVE BOX
Neurology Progress Note    S: Patient seen and examined in covid unit. No new events overnight. in obs room     Medication:    MEDICATIONS  (STANDING):  dextrose 40% Gel 15 Gram(s) Oral once  dextrose 5%. 1000 milliLiter(s) (50 mL/Hr) IV Continuous <Continuous>  dextrose 5%. 1000 milliLiter(s) (100 mL/Hr) IV Continuous <Continuous>  dextrose 50% Injectable 25 Gram(s) IV Push once  dextrose 50% Injectable 12.5 Gram(s) IV Push once  dextrose 50% Injectable 25 Gram(s) IV Push once  fluconAZOLE IVPB 200 milliGRAM(s) IV Intermittent every 24 hours  glucagon  Injectable 1 milliGRAM(s) IntraMuscular once  heparin   Injectable 5000 Unit(s) SubCutaneous every 12 hours  insulin lispro (ADMELOG) corrective regimen sliding scale   SubCutaneous every 6 hours  lactulose Syrup 10 Gram(s) Oral daily  sodium chloride 0.225%. 1000 milliLiter(s) (100 mL/Hr) IV Continuous <Continuous>    MEDICATIONS  (PRN):    Vitals:    Vital Signs Last 24 Hrs  T(C): 36.2 (22 @ 11:52), Max: 36.4 (22 @ 06:15)  T(F): 97.2 (22 @ 11:52), Max: 97.6 (22 @ 06:15)  HR: 102 (22 @ 11:52) (82 - 102)  BP: 106/63 (22 @ 11:52) (106/58 - 116/68)  BP(mean): --  RR: 18 (22 @ 11:52) (18 - 19)  SpO2: 97% (22 @ 11:52) (97% - 100%)              General Exam:   General Appearance: Appropriately dressed and in no acute distress       Head: Normocephalic, atraumatic and no dysmorphic features  Ear, Nose, and Throat: Moist mucous membranes  CVS: S1S2+  Resp: No SOB, no wheeze or rhonchi  GI: soft NT/ND  Extremities: No edema or cyanosis  Skin: No bruises or rashes     Neurological Exam:  Mental Status: Awake, alert and oriented x 0-1.  no verbal. not following but tries to mimics.   Cranial Nerves: PERRL, EOMI, VFFC, sensation V1-V3 intact,  R? facial asymmetry, equal elevation of palate, scm/trap 5/5, tongue is midline on protrusion.   hearing is grossly intact.   Motor: generlzied weakness. moving L>R and uppers > lowers; lowers are contracted   Sensation: withdraws x4  Reflexes: 1+ throughout at biceps, brachioradialis, triceps, patellars and ankles bilaterally and equal. No clonus.   Coordination: unable   Gait: unable     I personally reviewed the below data/images/labs:    CBC Full  -  ( 2022 07:48 )  WBC Count : 7.85 K/uL  RBC Count : 3.72 M/uL  Hemoglobin : 11.0 g/dL  Hematocrit : 36.1 %  Platelet Count - Automated : 87 K/uL  Mean Cell Volume : 97.0 fL  Mean Cell Hemoglobin : 29.6 pg  Mean Cell Hemoglobin Concentration : 30.5 gm/dL  Auto Neutrophil # : x  Auto Lymphocyte # : x  Auto Monocyte # : x  Auto Eosinophil # : x  Auto Basophil # : x  Auto Neutrophil % : x  Auto Lymphocyte % : x  Auto Monocyte % : x  Auto Eosinophil % : x  Auto Basophil % : x            155<H>  |  117<H>  |  89<H>  ----------------------------<  174<H>  3.7   |  14<L>  |  5.28<H>    Ca    8.2<L>      2022 07:48  Phos  6.5       Mg     2.10         TPro  6.0  /  Alb  2.5<L>  /  TBili  1.6<H>  /  DBili  x   /  AST  18  /  ALT  16  /  AlkPhos  286<H>        PT/INR - ( 2022 08:06 )   PT: 16.0 sec;   INR: 1.43 ratio         PTT - ( 2022 20:23 )  PTT:31.7 sec  Urinalysis Basic - ( 2022 20:56 )    Color: Yellow / Appearance: Clear / S.019 / pH: x  Gluc: x / Ketone: Negative  / Bili: Negative / Urobili: <2 mg/dL   Blood: x / Protein: 30 mg/dL / Nitrite: Negative   Leuk Esterase: Negative / RBC: 2 /HPF / WBC 10-20 /HPF   Sq Epi: x / Non Sq Epi: 1 /HPF / Bacteria: Few    < from: CT Head No Cont (22 @ 10:18) >    ACC: [id number] EXAM:  CT BRAIN                          PROCEDURE DATE:  2022          INTERPRETATION:  CLINICAL INDICATION: Altered mental status, cirrhosis,   portal hypertension    5mm axial sections of the brain were obtained from base to vertex,   without the intravenous administration of contrast material. Coronal and   sagittal computer generated reconstructed views are available.    No prior brain imaging is available for comparison.        The fourth, third and lateral ventricles are normal size and position.   There is no hemorrhage, mass or shift of the midline structures. There is   normal gray white matter differentiation. Bone window examination is   unremarkable.    IMPRESSION: Unremarkable noncontrast CT of the brain.    --- End of Report ---            ELZBIETA GEE MD; Attending Radiologist  This document has been electronically signed. 2022 10:25AM    < end of copied text >

## 2022-01-08 NOTE — PROGRESS NOTE ADULT - PROVIDER SPECIALTY LIST ADULT
Nephrology
Infectious Disease
Neurology
Infectious Disease
Infectious Disease
Nephrology
Neurology
Internal Medicine

## 2022-01-08 NOTE — PROGRESS NOTE ADULT - PROBLEM SELECTOR PLAN 1
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

## 2022-01-08 NOTE — DIETITIAN INITIAL EVALUATION ADULT. - ADD RECOMMEND
3) Monitor tolerance to EN, weight trends, nutrition related lab values, BMs/GI distress, hydration status.

## 2022-01-08 NOTE — PROGRESS NOTE ADULT - PROBLEM SELECTOR PROBLEM 9
Type 2 diabetes mellitus, with long-term current use of insulin

## 2022-01-08 NOTE — PROGRESS NOTE ADULT - PROBLEM SELECTOR PLAN 6
-likely multifactorial in setting of sepsis/fungemia, electrolyte abnormality/dehydration, cirrhosis   -reportedly A&Ox2 at baseline  -Ammonia elevated, receiving rectal lactulose. f/u ammonia tomorrow  -B12, TSH normal.  -CTH complete, negative   -fall, aspiration precautions  -NPO for now except medications, dysphagia screen when able,   -Refused S&S participation  -PT consult -likely multifactorial in setting of sepsis/fungemia, electrolyte abnormality/dehydration, cirrhosis   -reportedly A&Ox2 at baseline, improving toda, A&Ox1 and responsive to questioning.  -Ammonia elevated, received rectal lactulose and one dose oral lactulose through NG tube  -B12, TSH normal.  -CTH complete, negative   -fall, aspiration precautions  -NPO for now except medications, NG tube inserted yesterday but pulled O/N. Will replace later today.  -Refused S&S participation  -PT consult

## 2022-01-08 NOTE — PROGRESS NOTE ADULT - PROBLEM SELECTOR PROBLEM 12
Prolonged QT interval

## 2022-01-08 NOTE — PROGRESS NOTE ADULT - ASSESSMENT
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 Patient noted to be hypotensive as well w/VS prior to transfer: 97.6  108  88/47  17  97%RA. Patient also noted to have decreased level of consciousness, generalized weakness. Nephrology consulted for ANAHI     A/P    ANAHI   etiology?   worsening today   FENa< 1   patient has a history of ATN; was on HD before   ultrasound shows no hydronephrosis   clinically dehydrated  continue IVF ( 0.225% sodium chloride at 150cc for 12 hours )  spoke with his daughter Hunter Nathan for possible RRT   consent is in his chart   monitor BMP, U/O, volume status   Avoid further nephrotoxic medications, NSAIDS RCA     Lactic acidosis   etiology? likely 2/2 poor perfusion   continue IVF    Hypernatremia   likely 2/2 dehydration  worsening    continue IVF 0.225% sodium chloride at 150cc for 12 hours   Repeat serum na at  6  pm and call Dr Blount with results at [phone]   urine study suggests dehydration

## 2022-01-08 NOTE — PROGRESS NOTE ADULT - PROBLEM SELECTOR PLAN 2
-pt. with Hx candida parasilosis fungemia  -ID consulted, prev on caspofungin but now converted to fluconazole 200mg daily.   -received sensitivites from [facility] laboratory, sent to ID fellow.  -BCX NGTD, f/u fungal cx  -TTE  -will try and get more collateral regarding fungemia

## 2022-01-08 NOTE — PROGRESS NOTE ADULT - ASSESSMENT
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 AAO2 baseline.  o/e today R weakness, no verbal ?aphasic. gen weakness confused   B12 and TSH WNL   NH3 106  +thrombocytopenia  abnormal coags.   alk phos elevated   CRP elevated 145  +COVID  Ct abd with known L4/5 OM and lung disease   CTH unremarkable 1/5   A1c 4.9  NGT, mittens, confused     Impression: AMS likely multifactorial from toxic metabolic encephalopathy fromCOVID/ infection on top of hepatic encephalopathy.       - c/w caspofungin for OM  - treatment of infection/covid per primary team  - monitor inflammatory markers and resp status   - lactulose  - check NH3 again   - if no improvement MRI brain and EEG  - consider ASA if CTH shows infarct. and statin therapy  -  lipid panel  - TTE, tried unable patient not cooperative   - telemetry  - PT/OT/SS/SLP, OOBC  - check FS, glucose control <180  - GI/DVT ppx  - Differential diagnosis and plan of care discussed with patient and/or family and primary team  - Thank you for allowing me to participate in the care of this patient. Call with questions.   Jr Garibay MD  Vascular Neurology  Office: [phone]

## 2022-01-08 NOTE — DIETITIAN INITIAL EVALUATION ADULT. - PHYSCIAL ASSESSMENT
Dosing weight (1/4) 126.1 lbs. No weight history available via chart.  Pt appears severely cachectic upon visualization.  No pressure injuries noted at this time.

## 2022-01-08 NOTE — PROGRESS NOTE ADULT - PROBLEM SELECTOR PLAN 5
-likely in setting of poor PO intake/dehydration  - per nephro placed on 1/4NS, will continue today but trend labs q12  - c/w IVF as per nephro -likely in setting of poor PO intake/dehydration  - per nephro placed on 1/4NS, will continue today but trend labs q12  Na 155, up from 153.  - c/w IVF as per nephro

## 2022-01-08 NOTE — PROGRESS NOTE ADULT - SUBJECTIVE AND OBJECTIVE BOX
[facility]ROLOG[facility]ACT[facility]   MD DEMARCUS NORMAN MD, PA INJUNG KO NP    TEL:  OFFICE: [phone]  DR AGUAYO CELL: [phone]  DR. QUINTERO CELL: [phone]  ESTELLA RUBY CELL: [phone]  MARY FORTE CELL :[phone]    From 5pm-7am Answering Service [phone]    -- RENAL FOLLOW UP NOTE ---Date of Service 01-08-22 @ 11:50    Patient is a 66y old  Male who presents with a chief complaint of poor PO intake/FTT (08 Jan 2022 07:06)    Patient seen and examined at bedside.    VITALS:  T(F): 97.6 (01-08-22 @ 06:15), Max: 97.6 (01-08-22 @ 06:15)  HR: 82 (01-08-22 @ 06:15)  BP: 106/58 (01-08-22 @ 06:15)  RR: 18 (01-08-22 @ 06:15)  SpO2: 100% (01-08-22 @ 06:15)  Wt(kg): --    01-07 @ 07:01  -  01-08 @ 07:00  --------------------------------------------------------  IN: 0 mL / OUT: 400 mL / NET: -400 mL      PHYSICAL EXAM:  Constitutional: NAD  Neck: No JVD  Respiratory: CTAB, no wheezes, rales or rhonchi  Cardiovascular: S1, S2, RRR  Gastrointestinal: BS+, soft, NT/ND  Extremities: No peripheral edema    Hospital Medications:   MEDICATIONS  (STANDING):  dextrose 40% Gel 15 Gram(s) Oral once  dextrose 5%. 1000 milliLiter(s) (50 mL/Hr) IV Continuous <Continuous>  dextrose 5%. 1000 milliLiter(s) (75 mL/Hr) IV Continuous <Continuous>  dextrose 5%. 1000 milliLiter(s) (100 mL/Hr) IV Continuous <Continuous>  dextrose 50% Injectable 25 Gram(s) IV Push once  dextrose 50% Injectable 12.5 Gram(s) IV Push once  dextrose 50% Injectable 25 Gram(s) IV Push once  fluconAZOLE IVPB 200 milliGRAM(s) IV Intermittent every 24 hours  glucagon  Injectable 1 milliGRAM(s) IntraMuscular once  heparin   Injectable 5000 Unit(s) SubCutaneous every 12 hours  insulin lispro (ADMELOG) corrective regimen sliding scale   SubCutaneous every 6 hours  sodium chloride 0.225%. 1000 milliLiter(s) (150 mL/Hr) IV Continuous <Continuous>  sodium chloride 0.225%. 1000 milliLiter(s) (150 mL/Hr) IV Continuous <Continuous>      LABS:  01-08    155<H>  |  117<H>  |  89<H>  ----------------------------<  174<H>  3.7   |  14<L>  |  5.28<H>    Ca    8.2<L>      08 Jan 2022 07:48  Phos  6.5     01-08  Mg     2.10     01-08    TPro  6.0  /  Alb  2.5<L>  /  TBili  1.6<H>  /  DBili      /  AST  18  /  ALT  16  /  AlkPhos  286<H>  01-08    Creatinine Trend: 5.28 <--, 4.80 <--, 4.76 <--, 4.51 <--, 4.45 <--, 4.16 <--, 3.85 <--, 3.68 <--, 3.81 <--    Albumin, Serum: 2.5 g/dL (01-08 @ 07:48)  Phosphorus Level, Serum: 6.5 mg/dL (01-08 @ 07:48)  Phosphorus Level, Serum: 5.4 mg/dL (01-07 @ 17:52)                              11.0   7.85  )-----------( 87       ( 08 Jan 2022 07:48 )             36.1     Urine Studies:  Urinalysis - [01-06-22 @ 14:02]      Color Yellow / Appearance Clear / SG 1.019 / pH 6.0      Gluc Negative / Ketone Negative  / Bili Negative / Urobili <2 mg/dL       Blood Moderate / Protein 30 mg/dL / Leuk Est Negative / Nitrite Negative      RBC 11 / WBC 8 / Hyaline 18 / Gran  / Sq Epi  / Non Sq Epi 2 / Bacteria Negative    Urine Creatinine 165      [01-06-22 @ 14:02]  Urine Sodium <20      [01-06-22 @ 14:02]  Urine Osmolality 405      [01-06-22 @ 14:02]    Ferritin 1846      [01-04-22 @ 08:10]  TSH 1.33      [01-04-22 @ 08:10]    HCV 0.53, Nonreact      [01-05-22 @ 10:37]      RADIOLOGY & ADDITIONAL STUDIES:

## 2022-01-08 NOTE — PROGRESS NOTE ADULT - PROBLEM SELECTOR PLAN 7
- Baseline Cr .82 as of 8/2021, per daughter pt. received Abx treatment for OM that gave patient ATN with Cr 5-6 requiring HD in November/December, pt off HD for appx. 2 weeks.  -Cr uptrending, now 4.76  -monitor/trend renal function  -renally dose meds, avoid nephrotoxins  -monitor I/Os  -nephro following, may need HD - Baseline Cr .82 as of 8/2021, per daughter pt. received Abx treatment for OM that gave patient ATN with Cr 5-6 requiring HD in November/December, pt off HD for appx. 2 weeks.  -Cr uptrending, 4.76 ->5.28  -monitor/trend renal function  -renally dose meds, avoid nephrotoxins  -monitor I/Os  -nephro following, may need HD

## 2022-01-08 NOTE — DIETITIAN INITIAL EVALUATION ADULT. - REASON FOR ADMISSION
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

## 2022-01-08 NOTE — DIETITIAN INITIAL EVALUATION ADULT. - OTHER INFO
- Pt on RA at time of visit. Limited subjective assessment, pt does not participate in discussion.   - History of type 2 DM, HbA1c (1/4) 4.9%; on Admelog 8U TID and Metformin 500mg BID PTA per chart.   - NKFA. Diet PTA per nursing home transfer record: chopped solids/thin liquids, no concentrated sweets, no added salt, renal +LPS SF 30mL qD. +Calcitriol and folic acid supplementation. Reported poor PO intake PTA.   - S/p bedside swallow assessment (1/4), pt refused participation. NPO x3 days, NGT placed yesterday (1/7) with plan to initiate EN. Pt pulled out NGT overnight, plan to reattempt placement.   - Nephrology on board to assist with management of ANAHI. Pt continues on IVF. Labs notable for elevated Na and P.   - No noted GI distress, last BM today per flowsheets. Pt is not ordered for lactulose at this time (history of liver cirrhosis).

## 2022-01-08 NOTE — PROGRESS NOTE ADULT - ATTENDING COMMENTS
Pt care and plan discussed and reviewed with the house staff. Plan as outlined above edited by me to reflect our discussion. Thirty five minutes spent on encounter, of which more than fifty percent of the encounter was spent on counseling and/or coordinating care by the attending physician.
Pt care and plan discussed and reviewed with PA. Plan as outlined above edited by me to reflect our discussion. Thirty five minutes spent on encounter, of which more than fifty percent of the encounter was spent on counseling and/or coordinating care by the attending physician.
Pt care and plan discussed and reviewed with house staff. Plan as outlined above edited by me to reflect our discussion. Thirty five minutes spent on encounter, of which more than fifty percent of the encounter was spent on counseling and/or coordinating care by the attending physician. OMT on six regions for acute somatic dysfunctions done at the bedside.
Pt care and plan discussed and reviewed with house staff. Plan as outlined above edited by me to reflect our discussion. Thirty five minutes spent on encounter, of which more than fifty percent of the encounter was spent on counseling and/or coordinating care by the attending physician.
Pt care and plan discussed and reviewed with PA. Plan as outlined above edited by me to reflect our discussion. Thirty five minutes spent on encounter, of which more than fifty percent of the encounter was spent on counseling and/or coordinating care by the attending physician.

## 2022-01-08 NOTE — DIETITIAN INITIAL EVALUATION ADULT. - PERTINENT LABORATORY DATA
(1/8) Na 155<H>, BUN 89<H>, Cr 5.28<H>, <H>, K+ 3.7, Phos 6.5<H>, Mg 2.10, Alk Phos 286<H>, ALT/SGPT 16, AST/SGOT 18.  (1/5) HbA1c 4.9%.  POCT: (1/8) 118-152, (1/7) 122-135

## 2022-01-09 NOTE — CHART NOTE - NSCHARTNOTEFT_GEN_A_CORE
RRT initially called at 6:38AM on 1/9/2022 for unresponsiveness, reported to be last seen ~5AM. FS was <25. Found to be without a pulse and RRT transitioned to Code Blue. Pt noted to be FULL CODE. Compressions started, epinephrine given per ACLS protocol. Initial rhythm appeared to be asystole. 1 amp D50 given. IO placed in LLE. Pt noted to have coffee ground-like contents coming out of mouth during compressions/bagging. Rhythm remained asystole and without a pulse after 3 rounds of compressions and epi x2. Patient pronounced dead at 6:46AM on 1/9/2022. Covering night team provider attempted to call family but was unable to reach; day team to continue attempting to reach out to family.    Gary Bashir, PGY-3  MAR [id number] RRT initially called at 6:35AM on 1/9/2022 for unresponsiveness. FS was <25. Found to be without a pulse and RRT transitioned to Code Blue. Pt noted to be FULL CODE. Compressions started, epinephrine given per ACLS protocol. Initial rhythm appeared to be asystole. 1 amp D50 given. IO placed in LLE. Pt noted to have coffee ground-like contents coming out of mouth during compressions/bagging. Rhythm remained asystole and without a pulse after 3 rounds of compressions and epi x2. Patient pronounced dead at 6:46AM on 1/9/2022. Covering night team provider attempted to call family but was unable to reach; day team to continue attempting to reach out to family.    Gary Bashir, PGY-3  MAR [id number]

## 2022-01-09 NOTE — RAPID RESPONSE TEAM SUMMARY - NSSITUATIONBACKGROUNDRRT_GEN_ALL_CORE
66M w/ candida fungemia on amphotericin through 1/17/22, IDDM2, cirrhosis w/gastric varices, anemia/thrombocytopenia, CKD, lumbar OM, initially presented for poor PO intake and FTT. RRT initially called for unresponsiveness, found to be without a pulse, and upgraded to Code Blue. See Code Blue note.

## 2022-01-09 NOTE — CHART NOTE - NSCHARTNOTEFT_GEN_A_CORE
Spoke to patient's daughter Virginia ([phone]), and updated her regarding patient's death. Attempting to contact attending Dr. Godoy.    Jasper Pardo MD, PGY-3 Resident  Department of Internal Medicine  Pager: [phone] (NS) / [phone] (KRANTHI)  Email: [email or web address]

## 2022-01-09 NOTE — DISCHARGE NOTE FOR THE EXPIRED PATIENT - HOSPITAL COURSE
Mr. Harrison was a 66YOM with PMH of candida fungemia previously amphotericin, IDDM2, cirrhosis w/gastric varices and anemia/thrombocytopenia, CKD, lumbar OM, who presented from [facility] after recent d/c from [facility] due to poor PO intake, AMS and hypotension. He was evaluated by infectious disease and was started on caspofungin that was converted to fluconazole 200mg once sensitivities were obtained from the [facility]'s lab. The patient's sodium and creatinine were found to be uptrending during his hospitalization - per nephrology he was given D5W, D5 with bicarb, and eventually 1/4 NS with concern that dialysis may be necessary. He was also found to have acute encephalopathy likely due to an elevated ammonia level for which he was given lactulose and his mental status showed some improvement with decreasing ammonia. Pt was unable to tolerate PO medications and an NG tube was placed on 1/7 which the patient removed overnight and was replaced. The morning of 1/9 the patient was found to be unresponsive and a rapid response was called. He was found to be hypoglycemic and in asystole and resuscitation measures were conducted for appx. 15 minutes before the code was ended. Mr. Harrison was a 66YOM with PMH of candida fungemia previously amphotericin, IDDM2, cirrhosis w/gastric varices and anemia/thrombocytopenia, CKD, lumbar OM, who presented from [facility] after recent d/c from [facility] due to poor PO intake, AMS and hypotension. He was evaluated by infectious disease and was started on caspofungin that was converted to fluconazole 200mg once sensitivities were obtained from the [facility]'s lab. The patient's sodium and creatinine were found to be uptrending during his hospitalization - per nephrology he was given D5W, D5 with bicarb, and eventually 1/4 NS with concern that dialysis may be necessary. He was also found to have acute encephalopathy likely due to an elevated ammonia level for which he was given lactulose and his mental status showed some improvement with decreasing ammonia. Pt was unable to tolerate PO medications and an NG tube was placed on 1/7 which the patient removed overnight and was replaced. The morning of 1/9 the patient was found to be unresponsive and a rapid response was called. He was found to be hypoglycemic and in asystole and resuscitation measures were conducted for 11 minutes before the code was ended.
